# Patient Record
Sex: FEMALE | Race: WHITE | NOT HISPANIC OR LATINO | URBAN - METROPOLITAN AREA
[De-identification: names, ages, dates, MRNs, and addresses within clinical notes are randomized per-mention and may not be internally consistent; named-entity substitution may affect disease eponyms.]

---

## 2023-02-26 PROBLEM — Z78.0 POST-MENOPAUSAL: Status: ACTIVE | Noted: 2023-02-26

## 2023-02-26 PROBLEM — M17.9 OSTEOARTHRITIS OF KNEE: Status: ACTIVE | Noted: 2023-02-26

## 2023-02-26 PROBLEM — E66.01 MORBID OBESITY (MULTI): Status: ACTIVE | Noted: 2023-02-26

## 2023-02-26 PROBLEM — M19.90 OSTEOARTHRITIS: Status: ACTIVE | Noted: 2023-02-26

## 2023-02-26 PROBLEM — I10 BENIGN ESSENTIAL HYPERTENSION: Status: ACTIVE | Noted: 2023-02-26

## 2023-02-26 PROBLEM — F32.A DEPRESSION: Status: ACTIVE | Noted: 2023-02-26

## 2023-02-26 PROBLEM — M81.0 OSTEOPOROSIS: Status: ACTIVE | Noted: 2023-02-26

## 2023-02-26 RX ORDER — DICLOFENAC SODIUM 50 MG/1
1 TABLET, DELAYED RELEASE ORAL 3 TIMES DAILY
COMMUNITY
Start: 2022-09-12 | End: 2023-06-01 | Stop reason: SDUPTHER

## 2023-02-26 RX ORDER — POTASSIUM CHLORIDE 20 MEQ/1
TABLET, EXTENDED RELEASE ORAL
COMMUNITY
End: 2023-04-12 | Stop reason: SDUPTHER

## 2023-02-26 RX ORDER — MINERAL OIL
1 ENEMA (ML) RECTAL DAILY
COMMUNITY
Start: 2022-09-12

## 2023-02-26 RX ORDER — METOPROLOL SUCCINATE 50 MG/1
1 TABLET, EXTENDED RELEASE ORAL DAILY
COMMUNITY
Start: 2022-09-12 | End: 2023-04-12 | Stop reason: SDUPTHER

## 2023-02-26 RX ORDER — AMLODIPINE BESYLATE 10 MG/1
1 TABLET ORAL DAILY
COMMUNITY
End: 2023-04-12 | Stop reason: SDUPTHER

## 2023-02-26 RX ORDER — ENALAPRIL MALEATE 20 MG/1
1 TABLET ORAL DAILY
COMMUNITY
End: 2023-04-12 | Stop reason: SDUPTHER

## 2023-02-26 RX ORDER — ESCITALOPRAM OXALATE 10 MG/1
1 TABLET ORAL DAILY
COMMUNITY
End: 2023-04-12 | Stop reason: SDUPTHER

## 2023-02-26 RX ORDER — TRIAMTERENE/HYDROCHLOROTHIAZID 37.5-25 MG
1 TABLET ORAL DAILY
COMMUNITY
End: 2023-04-12 | Stop reason: SDUPTHER

## 2023-04-12 ENCOUNTER — OFFICE VISIT (OUTPATIENT)
Dept: PRIMARY CARE | Facility: CLINIC | Age: 78
End: 2023-04-12
Payer: MEDICARE

## 2023-04-12 ENCOUNTER — LAB (OUTPATIENT)
Dept: LAB | Facility: LAB | Age: 78
End: 2023-04-12
Payer: MEDICARE

## 2023-04-12 VITALS
HEART RATE: 72 BPM | OXYGEN SATURATION: 98 % | WEIGHT: 198.8 LBS | SYSTOLIC BLOOD PRESSURE: 131 MMHG | BODY MASS INDEX: 39.03 KG/M2 | HEIGHT: 60 IN | DIASTOLIC BLOOD PRESSURE: 65 MMHG

## 2023-04-12 DIAGNOSIS — F41.9 ANXIETY: ICD-10-CM

## 2023-04-12 DIAGNOSIS — I10 BENIGN ESSENTIAL HYPERTENSION: ICD-10-CM

## 2023-04-12 DIAGNOSIS — I10 BENIGN ESSENTIAL HYPERTENSION: Primary | ICD-10-CM

## 2023-04-12 DIAGNOSIS — Z23 ENCOUNTER FOR IMMUNIZATION: ICD-10-CM

## 2023-04-12 DIAGNOSIS — F32.1 CURRENT MODERATE EPISODE OF MAJOR DEPRESSIVE DISORDER WITHOUT PRIOR EPISODE (MULTI): ICD-10-CM

## 2023-04-12 DIAGNOSIS — E78.49 OTHER HYPERLIPIDEMIA: ICD-10-CM

## 2023-04-12 DIAGNOSIS — E87.6 HYPOKALEMIA: ICD-10-CM

## 2023-04-12 LAB
ALANINE AMINOTRANSFERASE (SGPT) (U/L) IN SER/PLAS: 14 U/L (ref 7–45)
ALBUMIN (G/DL) IN SER/PLAS: 4.2 G/DL (ref 3.4–5)
ALKALINE PHOSPHATASE (U/L) IN SER/PLAS: 81 U/L (ref 33–136)
ANION GAP IN SER/PLAS: 13 MMOL/L (ref 10–20)
ASPARTATE AMINOTRANSFERASE (SGOT) (U/L) IN SER/PLAS: 14 U/L (ref 9–39)
BASOPHILS (10*3/UL) IN BLOOD BY AUTOMATED COUNT: 0.04 X10E9/L (ref 0–0.1)
BASOPHILS/100 LEUKOCYTES IN BLOOD BY AUTOMATED COUNT: 0.6 % (ref 0–2)
BILIRUBIN TOTAL (MG/DL) IN SER/PLAS: 0.4 MG/DL (ref 0–1.2)
CALCIUM (MG/DL) IN SER/PLAS: 9.6 MG/DL (ref 8.6–10.6)
CARBON DIOXIDE, TOTAL (MMOL/L) IN SER/PLAS: 28 MMOL/L (ref 21–32)
CHLORIDE (MMOL/L) IN SER/PLAS: 106 MMOL/L (ref 98–107)
CHOLESTEROL (MG/DL) IN SER/PLAS: 188 MG/DL (ref 0–199)
CHOLESTEROL IN HDL (MG/DL) IN SER/PLAS: 57.2 MG/DL
CHOLESTEROL/HDL RATIO: 3.3
CREATININE (MG/DL) IN SER/PLAS: 0.79 MG/DL (ref 0.5–1.05)
EOSINOPHILS (10*3/UL) IN BLOOD BY AUTOMATED COUNT: 0.14 X10E9/L (ref 0–0.4)
EOSINOPHILS/100 LEUKOCYTES IN BLOOD BY AUTOMATED COUNT: 2.3 % (ref 0–6)
ERYTHROCYTE DISTRIBUTION WIDTH (RATIO) BY AUTOMATED COUNT: 13.9 % (ref 11.5–14.5)
ERYTHROCYTE MEAN CORPUSCULAR HEMOGLOBIN CONCENTRATION (G/DL) BY AUTOMATED: 30.7 G/DL (ref 32–36)
ERYTHROCYTE MEAN CORPUSCULAR VOLUME (FL) BY AUTOMATED COUNT: 92 FL (ref 80–100)
ERYTHROCYTES (10*6/UL) IN BLOOD BY AUTOMATED COUNT: 5.29 X10E12/L (ref 4–5.2)
GFR FEMALE: 78 ML/MIN/1.73M2
GLUCOSE (MG/DL) IN SER/PLAS: 107 MG/DL (ref 74–99)
HEMATOCRIT (%) IN BLOOD BY AUTOMATED COUNT: 48.5 % (ref 36–46)
HEMOGLOBIN (G/DL) IN BLOOD: 14.9 G/DL (ref 12–16)
IMMATURE GRANULOCYTES/100 LEUKOCYTES IN BLOOD BY AUTOMATED COUNT: 0.2 % (ref 0–0.9)
LDL: 98 MG/DL (ref 0–99)
LEUKOCYTES (10*3/UL) IN BLOOD BY AUTOMATED COUNT: 6.2 X10E9/L (ref 4.4–11.3)
LYMPHOCYTES (10*3/UL) IN BLOOD BY AUTOMATED COUNT: 1.29 X10E9/L (ref 0.8–3)
LYMPHOCYTES/100 LEUKOCYTES IN BLOOD BY AUTOMATED COUNT: 20.9 % (ref 13–44)
MONOCYTES (10*3/UL) IN BLOOD BY AUTOMATED COUNT: 0.42 X10E9/L (ref 0.05–0.8)
MONOCYTES/100 LEUKOCYTES IN BLOOD BY AUTOMATED COUNT: 6.8 % (ref 2–10)
NEUTROPHILS (10*3/UL) IN BLOOD BY AUTOMATED COUNT: 4.26 X10E9/L (ref 1.6–5.5)
NEUTROPHILS/100 LEUKOCYTES IN BLOOD BY AUTOMATED COUNT: 69.2 % (ref 40–80)
NRBC (PER 100 WBCS) BY AUTOMATED COUNT: 0 /100 WBC (ref 0–0)
PLATELETS (10*3/UL) IN BLOOD AUTOMATED COUNT: 151 X10E9/L (ref 150–450)
POTASSIUM (MMOL/L) IN SER/PLAS: 4.6 MMOL/L (ref 3.5–5.3)
PROTEIN TOTAL: 6.7 G/DL (ref 6.4–8.2)
SODIUM (MMOL/L) IN SER/PLAS: 142 MMOL/L (ref 136–145)
TRIGLYCERIDE (MG/DL) IN SER/PLAS: 166 MG/DL (ref 0–149)
UREA NITROGEN (MG/DL) IN SER/PLAS: 34 MG/DL (ref 6–23)
VLDL: 33 MG/DL (ref 0–40)

## 2023-04-12 PROCEDURE — 1159F MED LIST DOCD IN RCRD: CPT | Performed by: FAMILY MEDICINE

## 2023-04-12 PROCEDURE — 1160F RVW MEDS BY RX/DR IN RCRD: CPT | Performed by: FAMILY MEDICINE

## 2023-04-12 PROCEDURE — 3078F DIAST BP <80 MM HG: CPT | Performed by: FAMILY MEDICINE

## 2023-04-12 PROCEDURE — 85025 COMPLETE CBC W/AUTO DIFF WBC: CPT

## 2023-04-12 PROCEDURE — 80061 LIPID PANEL: CPT

## 2023-04-12 PROCEDURE — G0009 ADMIN PNEUMOCOCCAL VACCINE: HCPCS | Performed by: FAMILY MEDICINE

## 2023-04-12 PROCEDURE — 80053 COMPREHEN METABOLIC PANEL: CPT

## 2023-04-12 PROCEDURE — 1036F TOBACCO NON-USER: CPT | Performed by: FAMILY MEDICINE

## 2023-04-12 PROCEDURE — 90677 PCV20 VACCINE IM: CPT | Performed by: FAMILY MEDICINE

## 2023-04-12 PROCEDURE — 99214 OFFICE O/P EST MOD 30 MIN: CPT | Performed by: FAMILY MEDICINE

## 2023-04-12 PROCEDURE — 3075F SYST BP GE 130 - 139MM HG: CPT | Performed by: FAMILY MEDICINE

## 2023-04-12 PROCEDURE — 36415 COLL VENOUS BLD VENIPUNCTURE: CPT

## 2023-04-12 RX ORDER — ESCITALOPRAM OXALATE 10 MG/1
10 TABLET ORAL DAILY
Qty: 90 TABLET | Refills: 1 | Status: SHIPPED | OUTPATIENT
Start: 2023-04-12 | End: 2023-11-22 | Stop reason: SDUPTHER

## 2023-04-12 RX ORDER — ENALAPRIL MALEATE 20 MG/1
20 TABLET ORAL DAILY
Qty: 90 TABLET | Refills: 1 | Status: SHIPPED | OUTPATIENT
Start: 2023-04-12 | End: 2023-10-02

## 2023-04-12 RX ORDER — TRIAMTERENE/HYDROCHLOROTHIAZID 37.5-25 MG
1 TABLET ORAL DAILY
Qty: 90 TABLET | Refills: 1 | Status: SHIPPED | OUTPATIENT
Start: 2023-04-12 | End: 2023-11-22 | Stop reason: SDUPTHER

## 2023-04-12 RX ORDER — POTASSIUM CHLORIDE 20 MEQ/1
TABLET, EXTENDED RELEASE ORAL
Qty: 180 TABLET | Refills: 1 | Status: SHIPPED | OUTPATIENT
Start: 2023-04-12 | End: 2024-02-29 | Stop reason: SDUPTHER

## 2023-04-12 RX ORDER — METOPROLOL SUCCINATE 50 MG/1
50 TABLET, EXTENDED RELEASE ORAL DAILY
Qty: 90 TABLET | Refills: 1 | Status: SHIPPED | OUTPATIENT
Start: 2023-04-12 | End: 2023-11-22 | Stop reason: SDUPTHER

## 2023-04-12 RX ORDER — AMLODIPINE BESYLATE 10 MG/1
10 TABLET ORAL DAILY
Qty: 90 TABLET | Refills: 1 | Status: SHIPPED | OUTPATIENT
Start: 2023-04-12 | End: 2023-10-02

## 2023-04-12 NOTE — PROGRESS NOTES
Subjective   Patient ID: Agata Alatorre [id number] is a 77 y.o. female who presents for Follow-up (6mth follow up).    HPI     HTN - Norvasc 10 mg , metoprolol 50 mg , Triamterine - HCTZ, enalparil 20 mg daily.   also taking KCL 20 mg daily. Needs refill on medication.     Anxiety/Depression - on escitalopram 10 mg daily for last 10 years.   Following Keto Diet. Medication working really well denies any side effect.     history of LBBB used to see cardiology Dr. Cabrera. No recent chest pain or shortness of breath no headache or dizziness.  Osteoarthritis -patient has history of hip and knee replacement by Dr. Peng currently taking diclofenac for pain control    like to take her Dog out , read, playing games, [place].   Social History     Tobacco Use    Smoking status: Former     Packs/day: 1.00     Years: 35.00     Pack years: 35.00     Types: Cigarettes    Smokeless tobacco: Never   Substance Use Topics    Alcohol use: Never    Drug use: Never       Allergies   Allergen Reactions    Digoxin Unknown       Current Outpatient Medications   Medication Sig Dispense Refill    amLODIPine (Norvasc) 10 mg tablet Take 1 tablet (10 mg) by mouth once daily.      diclofenac (Voltaren) 50 mg EC tablet Take 1 tablet (50 mg) by mouth in the morning and 1 tablet (50 mg) in the evening and 1 tablet (50 mg) before bedtime.      enalapril (Vasotec) 20 mg tablet Take 1 tablet (20 mg) by mouth once daily.      escitalopram (Lexapro) 10 mg tablet Take 1 tablet (10 mg) by mouth once daily.      fexofenadine (Allegra) 180 mg tablet Take 1 tablet (180 mg) by mouth once daily.      metoprolol succinate XL (Toprol-XL) 50 mg 24 hr tablet Take 1 tablet (50 mg) by mouth once daily.      potassium chloride CR (Klor-Con M20) 20 mEq ER tablet Take by mouth. Take 1 tablet daily but 2 tablets every 2nd day as directed      triamterene-hydrochlorothiazid (Maxzide-25) 37.5-25 mg tablet Take 1 tablet by mouth once daily.       No current  facility-administered medications for this visit.       Physical Exam  /65 (BP Location: Right arm, Patient Position: Sitting, BP Cuff Size: Large adult)   Pulse 72   Ht 1.524 m (5')   Wt 90.2 kg (198 lb 12.8 oz)   SpO2 98%   BMI 38.83 kg/m²     General Appearance:  Alert, cooperative, no distress,   Head:  Normocephalic, atraumatic   Eyes:  PERRL, conjunctiva/corneas clear, EOM's intact,    Lungs:   Clear to auscultation bilaterally, respirations unlabored   Heart:  Regular rate and rhythm, S1 and S2 normal, no murmur,    Neurologic: Normal      No visits with results within 3 Month(s) from this visit.   Latest known visit with results is:   No results found for any previous visit.         Assessment/Plan   Diagnoses and all orders for this visit:  Benign essential hypertension  -     amLODIPine (Norvasc) 10 mg tablet; Take 1 tablet (10 mg) by mouth once daily.  -     enalapril (Vasotec) 20 mg tablet; Take 1 tablet (20 mg) by mouth once daily.  -     metoprolol succinate XL (Toprol-XL) 50 mg 24 hr tablet; Take 1 tablet (50 mg) by mouth once daily.  -     triamterene-hydrochlorothiazid (Maxzide-25) 37.5-25 mg tablet; Take 1 tablet by mouth once daily.  -     Comprehensive Metabolic Panel; Future  -     CBC and Auto Differential; Future  -     Lipid Panel; Future  Hypokalemia  -     potassium chloride CR (Klor-Con M20) 20 mEq ER tablet; Take 1 tablet daily but 2 tablets every 2nd day as directed  -     Comprehensive Metabolic Panel; Future  Anxiety  -     escitalopram (Lexapro) 10 mg tablet; Take 1 tablet (10 mg) by mouth once daily.  -     CBC and Auto Differential; Future  Current moderate episode of major depressive disorder without prior episode (CMS/HCC)  -     Lipid Panel; Future  Encounter for immunization  -     Pneumococcal conjugate vaccine, 20-valent, adult (PREVNAR 20)  Other hyperlipidemia  -     Lipid Panel; Future  Hypertension  Anxiety depression  Chronic knee arthritis  S/p hip and knee  replacement  History of LBBB  Screen colon cancer  Immunization    Hypertension   continue enalapril metoprolol amlodipine and triamterene hydrochlorothiazide  BP well controlled  Get labs     Anxiety depression  Mood stable with escitalopram 10 mg daily  Med refill given    S/p hip and knee replacement follow-up Ortho  Continue diclofenac as needed  Advised to get Cologuard  Prevnar 20 given    Mammo normal 2022   Cologuard 2022.  Can take Curcumin 500 twice daily for aches and pains    F/up PRN or 6 months

## 2023-04-13 ENCOUNTER — TELEPHONE (OUTPATIENT)
Dept: PRIMARY CARE | Facility: CLINIC | Age: 78
End: 2023-04-13
Payer: MEDICARE

## 2023-04-13 NOTE — TELEPHONE ENCOUNTER
----- Message from Cha Lyons MD sent at 4/13/2023  9:43 AM EDT -----  Slightly elevated glucose if patient want to consider hemoglobin A1c to check for diabetes we can check it.

## 2023-06-01 DIAGNOSIS — M19.90 OSTEOARTHRITIS, UNSPECIFIED OSTEOARTHRITIS TYPE, UNSPECIFIED SITE: ICD-10-CM

## 2023-06-01 RX ORDER — DICLOFENAC SODIUM 50 MG/1
50 TABLET, DELAYED RELEASE ORAL 3 TIMES DAILY
Qty: 270 TABLET | Refills: 1 | Status: SHIPPED | OUTPATIENT
Start: 2023-06-01 | End: 2023-11-22 | Stop reason: SDUPTHER

## 2023-09-29 DIAGNOSIS — I10 BENIGN ESSENTIAL HYPERTENSION: ICD-10-CM

## 2023-10-02 RX ORDER — AMLODIPINE BESYLATE 10 MG/1
10 TABLET ORAL DAILY
Qty: 90 TABLET | Refills: 1 | Status: SHIPPED | OUTPATIENT
Start: 2023-10-02 | End: 2024-02-29 | Stop reason: SDUPTHER

## 2023-10-02 RX ORDER — ENALAPRIL MALEATE 20 MG/1
20 TABLET ORAL DAILY
Qty: 90 TABLET | Refills: 1 | Status: SHIPPED | OUTPATIENT
Start: 2023-10-02 | End: 2024-02-29 | Stop reason: SDUPTHER

## 2023-10-10 ENCOUNTER — OFFICE VISIT (OUTPATIENT)
Dept: PRIMARY CARE | Facility: CLINIC | Age: 78
End: 2023-10-10
Payer: MEDICARE

## 2023-10-10 VITALS
WEIGHT: 201.8 LBS | HEART RATE: 74 BPM | HEIGHT: 60 IN | BODY MASS INDEX: 39.62 KG/M2 | DIASTOLIC BLOOD PRESSURE: 81 MMHG | OXYGEN SATURATION: 94 % | SYSTOLIC BLOOD PRESSURE: 145 MMHG

## 2023-10-10 DIAGNOSIS — Z00.00 ENCOUNTER FOR ANNUAL WELLNESS EXAM IN MEDICARE PATIENT: Primary | ICD-10-CM

## 2023-10-10 DIAGNOSIS — Z23 NEED FOR IMMUNIZATION AGAINST INFLUENZA: ICD-10-CM

## 2023-10-10 PROCEDURE — 1036F TOBACCO NON-USER: CPT | Performed by: FAMILY MEDICINE

## 2023-10-10 PROCEDURE — G0439 PPPS, SUBSEQ VISIT: HCPCS | Performed by: FAMILY MEDICINE

## 2023-10-10 PROCEDURE — 90662 IIV NO PRSV INCREASED AG IM: CPT | Performed by: FAMILY MEDICINE

## 2023-10-10 PROCEDURE — G0446 INTENS BEHAVE THER CARDIO DX: HCPCS | Performed by: FAMILY MEDICINE

## 2023-10-10 PROCEDURE — 1159F MED LIST DOCD IN RCRD: CPT | Performed by: FAMILY MEDICINE

## 2023-10-10 PROCEDURE — 3077F SYST BP >= 140 MM HG: CPT | Performed by: FAMILY MEDICINE

## 2023-10-10 PROCEDURE — 1160F RVW MEDS BY RX/DR IN RCRD: CPT | Performed by: FAMILY MEDICINE

## 2023-10-10 PROCEDURE — G0008 ADMIN INFLUENZA VIRUS VAC: HCPCS | Performed by: FAMILY MEDICINE

## 2023-10-10 PROCEDURE — 3079F DIAST BP 80-89 MM HG: CPT | Performed by: FAMILY MEDICINE

## 2023-10-10 PROCEDURE — 99497 ADVNCD CARE PLAN 30 MIN: CPT | Performed by: FAMILY MEDICINE

## 2023-10-10 PROCEDURE — 1170F FXNL STATUS ASSESSED: CPT | Performed by: FAMILY MEDICINE

## 2023-10-10 RX ORDER — CALCIPOTRIENE 50 UG/G
CREAM TOPICAL AS NEEDED
COMMUNITY
End: 2024-04-15 | Stop reason: ALTCHOICE

## 2023-10-10 ASSESSMENT — PATIENT HEALTH QUESTIONNAIRE - PHQ9
2. FEELING DOWN, DEPRESSED OR HOPELESS: NOT AT ALL
SUM OF ALL RESPONSES TO PHQ9 QUESTIONS 1 AND 2: 0
1. LITTLE INTEREST OR PLEASURE IN DOING THINGS: NOT AT ALL

## 2023-10-10 ASSESSMENT — ACTIVITIES OF DAILY LIVING (ADL)
DOING_HOUSEWORK: INDEPENDENT
BATHING: INDEPENDENT
TAKING_MEDICATION: INDEPENDENT
GROCERY_SHOPPING: INDEPENDENT
MANAGING_FINANCES: INDEPENDENT
DRESSING: INDEPENDENT

## 2023-10-10 NOTE — PROGRESS NOTES
Subjective   Patient ID: Agata Alatorre [id number] is a 77 y.o. female who presents for Medicare Annual Wellness Visit Subsequent (Medicare Wellness).    HPI     Pt is here for medicare wellness.   And is walking with cane.  No fall in last 6 months.  Patient is very independent doing everything by herself.  Patient driving going out for shopping good memory good hearing.  Pt is Following keto Diet and [place] desert and keto chips  HTN - Norvasc 10 mg , metoprolol 50 mg , Triamterine - HCTZ, enalparil 20 mg daily.   also taking KCL 20 mg daily. BP normal.    Anxiety/Depression - on escitalopram 10 mg daily for last 10 years.   Following Keto Diet. Medication working really well denies any side effect.     history of LBBB used to see cardiology Dr. Cabrera. No recent chest pain or shortness of breath no headache or dizziness.    Osteoarthritis -patient has history of hip and knee replacement by Dr. Peng currently taking diclofenac and turmeric for pain control    like to take her Dog out , read, playing games, Facebook.     Patient had COVID couple of weeks ago.  Patient required nothing.  Feeling somewhat fatigue and mild cough.  No chest pain shortness of breath no headache dizziness no fever.  Advised to take Mucinex for cough.  Social History     Tobacco Use    Smoking status: Former     Packs/day: 1.00     Years: 35.00     Additional pack years: 0.00     Total pack years: 35.00     Types: Cigarettes    Smokeless tobacco: Never   Vaping Use    Vaping Use: Never used   Substance Use Topics    Alcohol use: Never    Drug use: Never       Allergies   Allergen Reactions    Adhesive Tape-Silicones Other    Digoxin Unknown and Rash       Current Outpatient Medications   Medication Sig Dispense Refill    amLODIPine (Norvasc) 10 mg tablet TAKE 1 TABLET ONCE DAILY 90 tablet 1    calcipotriene (Dovonex) 0.005 % cream Apply topically if needed.      diclofenac (Voltaren) 50 mg EC tablet Take 1 tablet (50 mg) by mouth 3 times  a day. 270 tablet 1    enalapril (Vasotec) 20 mg tablet TAKE 1 TABLET ONCE DAILY 90 tablet 1    escitalopram (Lexapro) 10 mg tablet Take 1 tablet (10 mg) by mouth once daily. 90 tablet 1    fexofenadine (Allegra) 180 mg tablet Take 1 tablet (180 mg) by mouth once daily.      metoprolol succinate XL (Toprol-XL) 50 mg 24 hr tablet Take 1 tablet (50 mg) by mouth once daily. 90 tablet 1    potassium chloride CR (Klor-Con M20) 20 mEq ER tablet Take 1 tablet daily but 2 tablets every 2nd day as directed 180 tablet 1    triamterene-hydrochlorothiazid (Maxzide-25) 37.5-25 mg tablet Take 1 tablet by mouth once daily. 90 tablet 1     No current facility-administered medications for this visit.       Physical Exam  /81   Pulse 74   Ht 1.524 m (5')   Wt 91.5 kg (201 lb 12.8 oz)   SpO2 94%   BMI 39.41 kg/m²     General Appearance:  Alert, cooperative, no distress,   Head:  Normocephalic, atraumatic   Eyes:  PERRL, conjunctiva/corneas clear, EOM's intact,    Lungs:   Clear to auscultation bilaterally, respirations unlabored   Heart:  Regular rate and rhythm, S1 and S2 normal, no murmur,    Neurologic: Normal      No visits with results within 3 Month(s) from this visit.   Latest known visit with results is:   Lab on 04/12/2023   Component Date Value Ref Range Status    Glucose 04/12/2023 107 (H)  74 - 99 mg/dL Final    Sodium 04/12/2023 142  136 - 145 mmol/L Final    Potassium 04/12/2023 4.6  3.5 - 5.3 mmol/L Final    Chloride 04/12/2023 106  98 - 107 mmol/L Final    Bicarbonate 04/12/2023 28  21 - 32 mmol/L Final    Anion Gap 04/12/2023 13  10 - 20 mmol/L Final    Urea Nitrogen 04/12/2023 34 (H)  6 - 23 mg/dL Final    Creatinine 04/12/2023 0.79  0.50 - 1.05 mg/dL Final    GFR Female 04/12/2023 78  >90 mL/min/1.73m2 Final     CALCULATIONS OF ESTIMATED GFR ARE PERFORMED   USING THE 2021 CKD-EPI STUDY REFIT EQUATION   WITHOUT THE RACE VARIABLE FOR THE IDMS-TRACEABLE   CREATININE  METHODS.    https://jasn.asnjournals.org/content/early/2021/09/22/ASN.[email or web address]    Calcium 04/12/2023 9.6  8.6 - 10.6 mg/dL Final    Albumin 04/12/2023 4.2  3.4 - 5.0 g/dL Final    Alkaline Phosphatase 04/12/2023 81  33 - 136 U/L Final    Total Protein 04/12/2023 6.7  6.4 - 8.2 g/dL Final    AST 04/12/2023 14  9 - 39 U/L Final    Total Bilirubin 04/12/2023 0.4  0.0 - 1.2 mg/dL Final    ALT (SGPT) 04/12/2023 14  7 - 45 U/L Final     Patients treated with Sulfasalazine may generate    falsely decreased results for ALT.    WBC 04/12/2023 6.2  4.4 - 11.3 x10E9/L Final    nRBC 04/12/2023 0.0  0.0 - 0.0 /100 WBC Final    RBC 04/12/2023 5.29 (H)  4.00 - 5.20 x10E12/L Final    Hemoglobin 04/12/2023 14.9  12.0 - 16.0 g/dL Final    Hematocrit 04/12/2023 48.5 (H)  36.0 - 46.0 % Final    MCV 04/12/2023 92  80 - 100 fL Final    MCHC 04/12/2023 30.7 (L)  32.0 - 36.0 g/dL Final    Platelets 04/12/2023 151  150 - 450 x10E9/L Final    RDW 04/12/2023 13.9  11.5 - 14.5 % Final    Neutrophils % 04/12/2023 69.2  40.0 - 80.0 % Final    Immature Granulocytes %, Automated 04/12/2023 0.2  0.0 - 0.9 % Final     Immature Granulocyte Count (IG) includes promyelocytes,    myelocytes and metamyelocytes but does not include bands.   Percent differential counts (%) should be interpreted in the   context of the absolute cell counts (cells/L).    Lymphocytes % 04/12/2023 20.9  13.0 - 44.0 % Final    Monocytes % 04/12/2023 6.8  2.0 - 10.0 % Final    Eosinophils % 04/12/2023 2.3  0.0 - 6.0 % Final    Basophils % 04/12/2023 0.6  0.0 - 2.0 % Final    Neutrophils Absolute 04/12/2023 4.26  1.60 - 5.50 x10E9/L Final    Lymphocytes Absolute 04/12/2023 1.29  0.80 - 3.00 x10E9/L Final    Monocytes Absolute 04/12/2023 0.42  0.05 - 0.80 x10E9/L Final    Eosinophils Absolute 04/12/2023 0.14  0.00 - 0.40 x10E9/L Final    Basophils Absolute 04/12/2023 0.04  0.00 - 0.10 x10E9/L Final    Cholesterol 04/12/2023 188  0 - 199 mg/dL Final    .      AGE      DESIRABLE    BORDERLINE HIGH   HIGH     0-19 Y     0 - 169       170 - 199     >/= 200    20-24 Y     0 - 189       190 - 224     >/= 225         >24 Y     0 - 199       200 - 239     >/= 240   **All ranges are based on fasting samples. Specific   therapeutic targets will vary based on patient-specific   cardiac risk.  .   Pediatric guidelines reference:Pediatrics 2011, 128(S5).   Adult guidelines reference: NCEP ATPIII Guidelines,     TANYA 2001, 258:0036-97  .   Venipuncture immediately after or during the    administration of Metamizole may lead to falsely   low results. Testing should be performed immediately   prior to Metamizole dosing.    HDL 04/12/2023 57.2  mg/dL Final    .      AGE      VERY LOW   LOW     NORMAL    HIGH       0-19 Y       < 35   < 40     40-45     ----    20-24 Y       ----   < 40       >45     ----      >24 Y       ----   < 40     40-60      >60  .    Cholesterol/HDL Ratio 04/12/2023 3.3   Final    REF VALUES  DESIRABLE  < 3.4  HIGH RISK  > 5.0    LDL 04/12/2023 98  0 - 99 mg/dL Final    .                           NEAR      BORD      AGE      DESIRABLE  OPTIMAL    HIGH     HIGH     VERY HIGH     0-19 Y     0 - 109     ---    110-129   >/= 130     ----    20-24 Y     0 - 119     ---    120-159   >/= 160     ----      >24 Y     0 -  99   100-129  130-159   160-189     >/=190  .    VLDL 04/12/2023 33  0 - 40 mg/dL Final    Triglycerides 04/12/2023 166 (H)  0 - 149 mg/dL Final    .      AGE      DESIRABLE   BORDERLINE HIGH   HIGH     VERY HIGH   0 D-90 D    19 - 174         ----         ----        ----  91 D- 9 Y     0 -  74        75 -  99     >/= 100      ----    10-19 Y     0 -  89        90 - 129     >/= 130      ----    20-24 Y     0 - 114       115 - 149     >/= 150      ----         >24 Y     0 - 149       150 - 199    200- 499    >/= 500  .   Venipuncture immediately after or during the    administration of Metamizole may lead to falsely   low results. Testing should be performed immediately    prior to Metamizole dosing.         Assessment/Plan   Diagnoses and all orders for this visit:  Agata was seen today for medicare annual wellness visit subsequent.  Diagnoses and all orders for this visit:  Encounter for annual wellness exam in Medicare patient (Primary)  Need for immunization against influenza  -     Flu vaccine, quadrivalent, high-dose, preservative free, age 65y+ (FLUZONE)  BMI 39.0-39.9,adult  Patient was advised to follow healthy diet and do daily walking or walking in a pool to improve pain and lose weight  Labs reviewed from last visit and pt made aware.  Anxiety/MDD  -     escitalopram (Lexapro) 10 mg tablet; Take 1 tablet (10 mg) by mouth once daily.    Hypertension  Anxiety depression  Chronic knee arthritis  S/p hip and knee replacement  History of LBBB  Screen colon cancer  Immunization    Hypertension   continue enalapril metoprolol amlodipine and triamterene hydrochlorothiazide  BP well controlled      Anxiety depression  Mood stable with escitalopram 10 mg daily  Med refill given    S/p hip and knee replacement follow-up Ortho  Continue diclofenac as needed  Advised to get Cologuard  Prevnar 20 given    Mammo normal [id number]  DEXA scan October 2020 2 repeat October 2024  Up-to-date flu and Prevnar 20  Advised to get Shingrix at pharmacy once respiratory symptoms are better   Cologuard 2022.  continue Curcumin 500 twice daily for aches and pains    F/up PRN or 6 months.    Advance Care Planning   Does not have living will or medical power of .  Patient is going to make one.  Currently full code.    Advanced care planning was discussed for 20 mins  DIscussed in details the options of advanced directives with patient in a voluntary nature. We discussed the differences between full code, comfort care arrest and comfort care. Patient was educated in detail regarding end-of-life care including options for hospice and palliative care. I provided details to patient regarding the  importance of creating both a Living Will and Power of  documents and encouraged patient to complete these documents.    I spent 15 minutes on cardiovascular councelling. Pt was advised to take meds for HTN, HLD. Discussed at length about various ways of loosing weight including healthy Diet, daily Aerobic exercise, taking meds.      F/up 6 months

## 2023-10-12 ENCOUNTER — APPOINTMENT (OUTPATIENT)
Dept: PRIMARY CARE | Facility: CLINIC | Age: 78
End: 2023-10-12
Payer: MEDICARE

## 2023-11-22 DIAGNOSIS — F41.9 ANXIETY: ICD-10-CM

## 2023-11-22 DIAGNOSIS — M19.90 OSTEOARTHRITIS, UNSPECIFIED OSTEOARTHRITIS TYPE, UNSPECIFIED SITE: ICD-10-CM

## 2023-11-22 DIAGNOSIS — I10 BENIGN ESSENTIAL HYPERTENSION: ICD-10-CM

## 2023-11-22 RX ORDER — DICLOFENAC SODIUM 50 MG/1
50 TABLET, DELAYED RELEASE ORAL 3 TIMES DAILY
Qty: 270 TABLET | Refills: 1 | Status: SHIPPED | OUTPATIENT
Start: 2023-11-22

## 2023-11-22 RX ORDER — TRIAMTERENE/HYDROCHLOROTHIAZID 37.5-25 MG
1 TABLET ORAL DAILY
Qty: 90 TABLET | Refills: 1 | Status: SHIPPED | OUTPATIENT
Start: 2023-11-22 | End: 2024-04-15 | Stop reason: ALTCHOICE

## 2023-11-22 RX ORDER — METOPROLOL SUCCINATE 50 MG/1
50 TABLET, EXTENDED RELEASE ORAL DAILY
Qty: 90 TABLET | Refills: 1 | Status: SHIPPED | OUTPATIENT
Start: 2023-11-22 | End: 2024-05-07 | Stop reason: SDUPTHER

## 2023-11-22 RX ORDER — ESCITALOPRAM OXALATE 10 MG/1
10 TABLET ORAL DAILY
Qty: 90 TABLET | Refills: 1 | Status: SHIPPED | OUTPATIENT
Start: 2023-11-22 | End: 2024-05-07 | Stop reason: SDUPTHER

## 2024-02-29 DIAGNOSIS — E87.6 HYPOKALEMIA: ICD-10-CM

## 2024-02-29 DIAGNOSIS — I10 BENIGN ESSENTIAL HYPERTENSION: ICD-10-CM

## 2024-02-29 RX ORDER — AMLODIPINE BESYLATE 10 MG/1
10 TABLET ORAL DAILY
Qty: 90 TABLET | Refills: 1 | Status: SHIPPED | OUTPATIENT
Start: 2024-02-29

## 2024-02-29 RX ORDER — POTASSIUM CHLORIDE 20 MEQ/1
TABLET, EXTENDED RELEASE ORAL
Qty: 180 TABLET | Refills: 1 | Status: SHIPPED | OUTPATIENT
Start: 2024-02-29

## 2024-02-29 RX ORDER — ENALAPRIL MALEATE 20 MG/1
20 TABLET ORAL DAILY
Qty: 90 TABLET | Refills: 1 | Status: SHIPPED | OUTPATIENT
Start: 2024-02-29 | End: 2024-04-15 | Stop reason: SDUPTHER

## 2024-04-10 ENCOUNTER — APPOINTMENT (OUTPATIENT)
Dept: PRIMARY CARE | Facility: CLINIC | Age: 79
End: 2024-04-10
Payer: MEDICARE

## 2024-04-15 ENCOUNTER — OFFICE VISIT (OUTPATIENT)
Dept: PRIMARY CARE | Facility: CLINIC | Age: 79
End: 2024-04-15
Payer: MEDICARE

## 2024-04-15 ENCOUNTER — TELEPHONE (OUTPATIENT)
Dept: PRIMARY CARE | Facility: CLINIC | Age: 79
End: 2024-04-15

## 2024-04-15 VITALS
DIASTOLIC BLOOD PRESSURE: 103 MMHG | SYSTOLIC BLOOD PRESSURE: 164 MMHG | HEIGHT: 60 IN | OXYGEN SATURATION: 93 % | WEIGHT: 208 LBS | HEART RATE: 85 BPM | BODY MASS INDEX: 40.84 KG/M2

## 2024-04-15 DIAGNOSIS — I10 BENIGN ESSENTIAL HYPERTENSION: ICD-10-CM

## 2024-04-15 DIAGNOSIS — I10 BENIGN ESSENTIAL HYPERTENSION: Primary | ICD-10-CM

## 2024-04-15 DIAGNOSIS — R06.02 SHORTNESS OF BREATH: ICD-10-CM

## 2024-04-15 DIAGNOSIS — E78.49 OTHER HYPERLIPIDEMIA: ICD-10-CM

## 2024-04-15 DIAGNOSIS — F41.9 ANXIETY: ICD-10-CM

## 2024-04-15 PROCEDURE — 93000 ELECTROCARDIOGRAM COMPLETE: CPT | Performed by: FAMILY MEDICINE

## 2024-04-15 PROCEDURE — 3080F DIAST BP >= 90 MM HG: CPT | Performed by: FAMILY MEDICINE

## 2024-04-15 PROCEDURE — 99214 OFFICE O/P EST MOD 30 MIN: CPT | Performed by: FAMILY MEDICINE

## 2024-04-15 PROCEDURE — 3077F SYST BP >= 140 MM HG: CPT | Performed by: FAMILY MEDICINE

## 2024-04-15 PROCEDURE — 1036F TOBACCO NON-USER: CPT | Performed by: FAMILY MEDICINE

## 2024-04-15 PROCEDURE — 1159F MED LIST DOCD IN RCRD: CPT | Performed by: FAMILY MEDICINE

## 2024-04-15 PROCEDURE — 1160F RVW MEDS BY RX/DR IN RCRD: CPT | Performed by: FAMILY MEDICINE

## 2024-04-15 RX ORDER — LOSARTAN POTASSIUM AND HYDROCHLOROTHIAZIDE 12.5; 5 MG/1; MG/1
1 TABLET ORAL DAILY
Qty: 10 TABLET | Refills: 0 | Status: SHIPPED | OUTPATIENT
Start: 2024-04-15 | End: 2024-05-07 | Stop reason: ALTCHOICE

## 2024-04-15 RX ORDER — ENALAPRIL MALEATE 20 MG/1
20 TABLET ORAL 2 TIMES DAILY
Qty: 180 TABLET | Refills: 0 | Status: SHIPPED | OUTPATIENT
Start: 2024-04-15 | End: 2024-04-16 | Stop reason: SDUPTHER

## 2024-04-15 RX ORDER — LOSARTAN POTASSIUM AND HYDROCHLOROTHIAZIDE 25; 100 MG/1; MG/1
1 TABLET ORAL DAILY
Qty: 90 TABLET | Refills: 0 | Status: SHIPPED | OUTPATIENT
Start: 2024-04-15 | End: 2024-04-15 | Stop reason: ALTCHOICE

## 2024-04-15 ASSESSMENT — ENCOUNTER SYMPTOMS
LOSS OF SENSATION IN FEET: 0
OCCASIONAL FEELINGS OF UNSTEADINESS: 0
DEPRESSION: 0

## 2024-04-15 NOTE — PROGRESS NOTES
Subjective   Patient ID: Agata Alatorre [id number] is a 78 y.o. female who presents for Follow-up (Follow up feeling SOB and coughing spells still since ).    HPI     Pt is Following keto Diet and Juan desert and keto chips.  Was sick few weeks ago.  With flu.  Patient took symptomatic treatment.  Most symptoms are better.  Noticing some shortness of breath on exertion.  BP elevated.  Denies headache or dizziness no chest pain no cough.    HTN - Norvasc 10 mg , metoprolol 50 mg , Triamterine - HCTZ, enalparil 20 mg daily.   also taking KCL 20 mg daily. BP elevated    Anxiety/Depression - on escitalopram 10 mg daily for last 10 years.   Following Keto Diet. Medication working really well denies any side effect.     history of LBBB used to see cardiology Dr. Cabrera. No recent chest pain or shortness of breath no headache or dizziness.    Osteoarthritis -patient has history of hip and knee replacement by Dr. Peng currently taking diclofenac and turmeric for pain control    like to take her Dog out , read, playing games, [employer].       Social History     Tobacco Use    Smoking status: Former     Current packs/day: 0.00     Average packs/day: 1 pack/day for 35.0 years (35.0 ttl pk-yrs)     Types: Cigarettes     Quit date: 1996     Years since quittin.0    Smokeless tobacco: Never   Vaping Use    Vaping status: Never Used   Substance Use Topics    Alcohol use: Never    Drug use: Never       Allergies   Allergen Reactions    Adhesive Tape-Silicones Other    Digoxin Unknown and Rash       Current Outpatient Medications   Medication Sig Dispense Refill    amLODIPine (Norvasc) 10 mg tablet Take 1 tablet (10 mg) by mouth once daily. 90 tablet 1    diclofenac (Voltaren) 50 mg EC tablet Take 1 tablet (50 mg) by mouth 3 times a day. 270 tablet 1    enalapril (Vasotec) 20 mg tablet Take 1 tablet (20 mg) by mouth once daily. 90 tablet 1    escitalopram (Lexapro) 10 mg tablet Take 1 tablet (10 mg) by mouth once daily.  90 tablet 1    fexofenadine (Allegra) 180 mg tablet Take 1 tablet (180 mg) by mouth once daily.      metoprolol succinate XL (Toprol-XL) 50 mg 24 hr tablet Take 1 tablet (50 mg) by mouth once daily. 90 tablet 1    potassium chloride CR 20 mEq ER tablet Take 1 tablet daily but 2 tablets every 2nd day as directed 180 tablet 1    triamterene-hydrochlorothiazid (Maxzide-25) 37.5-25 mg tablet Take 1 tablet by mouth once daily. 90 tablet 1     No current facility-administered medications for this visit.       Physical Exam  BP (!) 164/103   Pulse 85   Ht 1.524 m (5')   Wt 94.3 kg (208 lb)   SpO2 93%   BMI 40.62 kg/m²     General Appearance:  Alert, cooperative, no distress,   Head:  Normocephalic, atraumatic   Eyes:  PERRL, conjunctiva/corneas clear, EOM's intact,    Lungs:   Clear to auscultation bilaterally, respirations unlabored   Heart:  Regular rate and rhythm, S1 and S2 normal, no murmur,    Neurologic: Normal      No visits with results within 3 Month(s) from this visit.   Latest known visit with results is:   Lab on 04/12/2023   Component Date Value Ref Range Status    Glucose 04/12/2023 107 (H)  74 - 99 mg/dL Final    Sodium 04/12/2023 142  136 - 145 mmol/L Final    Potassium 04/12/2023 4.6  3.5 - 5.3 mmol/L Final    Chloride 04/12/2023 106  98 - 107 mmol/L Final    Bicarbonate 04/12/2023 28  21 - 32 mmol/L Final    Anion Gap 04/12/2023 13  10 - 20 mmol/L Final    Urea Nitrogen 04/12/2023 34 (H)  6 - 23 mg/dL Final    Creatinine 04/12/2023 0.79  0.50 - 1.05 mg/dL Final    GFR Female 04/12/2023 78  >90 mL/min/1.73m2 Final     CALCULATIONS OF ESTIMATED GFR ARE PERFORMED   USING THE 2021 CKD-EPI STUDY REFIT EQUATION   WITHOUT THE RACE VARIABLE FOR THE IDMS-TRACEABLE   CREATININE METHODS.    https://jasn.asnjournals.org/content/early/2021/09/22/ASN.7065935572    Calcium 04/12/2023 9.6  8.6 - 10.6 mg/dL Final    Albumin 04/12/2023 4.2  3.4 - 5.0 g/dL Final    Alkaline Phosphatase 04/12/2023 81  33 - 136 U/L  Final    Total Protein 04/12/2023 6.7  6.4 - 8.2 g/dL Final    AST 04/12/2023 14  9 - 39 U/L Final    Total Bilirubin 04/12/2023 0.4  0.0 - 1.2 mg/dL Final    ALT (SGPT) 04/12/2023 14  7 - 45 U/L Final     Patients treated with Sulfasalazine may generate    falsely decreased results for ALT.    WBC 04/12/2023 6.2  4.4 - 11.3 x10E9/L Final    nRBC 04/12/2023 0.0  0.0 - 0.0 /100 WBC Final    RBC 04/12/2023 5.29 (H)  4.00 - 5.20 x10E12/L Final    Hemoglobin 04/12/2023 14.9  12.0 - 16.0 g/dL Final    Hematocrit 04/12/2023 48.5 (H)  36.0 - 46.0 % Final    MCV 04/12/2023 92  80 - 100 fL Final    MCHC 04/12/2023 30.7 (L)  32.0 - 36.0 g/dL Final    Platelets 04/12/2023 151  150 - 450 x10E9/L Final    RDW 04/12/2023 13.9  11.5 - 14.5 % Final    Neutrophils % 04/12/2023 69.2  40.0 - 80.0 % Final    Immature Granulocytes %, Automated 04/12/2023 0.2  0.0 - 0.9 % Final     Immature Granulocyte Count (IG) includes promyelocytes,    myelocytes and metamyelocytes but does not include bands.   Percent differential counts (%) should be interpreted in the   context of the absolute cell counts (cells/L).    Lymphocytes % 04/12/2023 20.9  13.0 - 44.0 % Final    Monocytes % 04/12/2023 6.8  2.0 - 10.0 % Final    Eosinophils % 04/12/2023 2.3  0.0 - 6.0 % Final    Basophils % 04/12/2023 0.6  0.0 - 2.0 % Final    Neutrophils Absolute 04/12/2023 4.26  1.60 - 5.50 x10E9/L Final    Lymphocytes Absolute 04/12/2023 1.29  0.80 - 3.00 x10E9/L Final    Monocytes Absolute 04/12/2023 0.42  0.05 - 0.80 x10E9/L Final    Eosinophils Absolute 04/12/2023 0.14  0.00 - 0.40 x10E9/L Final    Basophils Absolute 04/12/2023 0.04  0.00 - 0.10 x10E9/L Final    Cholesterol 04/12/2023 188  0 - 199 mg/dL Final    .      AGE      DESIRABLE   BORDERLINE HIGH   HIGH     0-19 Y     0 - 169       170 - 199     >/= 200    20-24 Y     0 - 189       190 - 224     >/= 225         >24 Y     0 - 199       200 - 239     >/= 240   **All ranges are based on fasting samples.  Specific   therapeutic targets will vary based on patient-specific   cardiac risk.  .   Pediatric guidelines reference:Pediatrics 2011, 128(S5).   Adult guidelines reference: NCEP ATPIII Guidelines,     TANYA 2001, 258:2486-97  .   Venipuncture immediately after or during the    administration of Metamizole may lead to falsely   low results. Testing should be performed immediately   prior to Metamizole dosing.    HDL 04/12/2023 57.2  mg/dL Final    .      AGE      VERY LOW   LOW     NORMAL    HIGH       0-19 Y       < 35   < 40     40-45     ----    20-24 Y       ----   < 40       >45     ----      >24 Y       ----   < 40     40-60      >60  .    Cholesterol/HDL Ratio 04/12/2023 3.3   Final    REF VALUES  DESIRABLE  < 3.4  HIGH RISK  > 5.0    LDL 04/12/2023 98  0 - 99 mg/dL Final    .                           NEAR      BORD      AGE      DESIRABLE  OPTIMAL    HIGH     HIGH     VERY HIGH     0-19 Y     0 - 109     ---    110-129   >/= 130     ----    20-24 Y     0 - 119     ---    120-159   >/= 160     ----      >24 Y     0 -  99   100-129  130-159   160-189     >/=190  .    VLDL 04/12/2023 33  0 - 40 mg/dL Final    Triglycerides 04/12/2023 166 (H)  0 - 149 mg/dL Final    .      AGE      DESIRABLE   BORDERLINE HIGH   HIGH     VERY HIGH   0 D-90 D    19 - 174         ----         ----        ----  91 D- 9 Y     0 -  74        75 -  99     >/= 100      ----    10-19 Y     0 -  89        90 - 129     >/= 130      ----    20-24 Y     0 - 114       115 - 149     >/= 150      ----         >24 Y     0 - 149       150 - 199    200- 499    >/= 500  .   Venipuncture immediately after or during the    administration of Metamizole may lead to falsely   low results. Testing should be performed immediately   prior to Metamizole dosing.         Assessment/Plan   Diagnoses and all orders for this visit:  Agata was seen today for follow-up.  Diagnoses and all orders for this visit:  Benign essential hypertension (Primary)  -      Discontinue: losartan-hydrochlorothiazide (Hyzaar) 100-25 mg tablet; Take 1 tablet by mouth once daily.  -     losartan-hydrochlorothiazide (Hyzaar) 50-12.5 mg tablet; Take 1 tablet by mouth once daily.  -     ECG 12 lead (Clinic Performed)  -     enalapril (Vasotec) 20 mg tablet; Take 1 tablet (20 mg) by mouth 2 times a day.  Anxiety  Other hyperlipidemia  Shortness of breath  -     ECG 12 lead (Clinic Performed)        Hypertension  Anxiety depression  Chronic knee arthritis  S/p hip and knee replacement  History of LBBB  Screen colon cancer  Immunization    Hypertension  Increase Enalapril to 20 mg BID  Continue metoprolol amlodipine and triamterene hydrochlorothiazide  BP elevated  EKG in office with LBBB - unchanged from Prior  Will consider Cardiology follow-up if persistent shortness of breath after increasing enalapril.    Anxiety depression  Mood stable with escitalopram 10 mg daily  Med refill given    S/p hip and knee replacement follow-up Ortho  Continue diclofenac as needed  Advised to get Cologuard  Prevnar 20 given    Mammo normal [id number]  DEXA scan October 2020 2 repeat October 2024  Up-to-date flu and Prevnar 20  Advised to get Shingrix at pharmacy once respiratory symptoms are better   Cologuard 2022.  continue Curcumin 500 twice daily for aches and pains    F/up PRN or 6 months.    Advance Care Planning   Does not have living will or medical power of .  Patient is going to make one.  Currently full code.          F/up 2 weeks for repeat BP and SOBOE reveal

## 2024-04-16 RX ORDER — ENALAPRIL MALEATE 20 MG/1
20 TABLET ORAL 2 TIMES DAILY
Qty: 180 TABLET | Refills: 0 | Status: SHIPPED | OUTPATIENT
Start: 2024-04-16

## 2024-04-18 ENCOUNTER — TELEPHONE (OUTPATIENT)
Dept: PRIMARY CARE | Facility: CLINIC | Age: 79
End: 2024-04-18
Payer: MEDICARE

## 2024-04-18 NOTE — TELEPHONE ENCOUNTER
Pt is confused with medications. She was told to stop taking triamterene and to start taking losartan- hydrochlorothiazide instead. She was given only a 10 day supply and was told she will get more through her mail pharmacy but didn't receive anything.      She doesn't know if she should go back to the triamterene since you increase her enalapril to BID and doesn't have losartan-hydrochlorothiazide. Please advice

## 2024-05-06 ASSESSMENT — ENCOUNTER SYMPTOMS
SHORTNESS OF BREATH: 0
SWEATS: 0
PALPITATIONS: 0
NECK PAIN: 0
HYPERTENSION: 1
ORTHOPNEA: 0
PND: 0
BLURRED VISION: 0
HEADACHES: 0

## 2024-05-07 ENCOUNTER — LAB (OUTPATIENT)
Dept: LAB | Facility: LAB | Age: 79
End: 2024-05-07
Payer: MEDICARE

## 2024-05-07 ENCOUNTER — OFFICE VISIT (OUTPATIENT)
Dept: PRIMARY CARE | Facility: CLINIC | Age: 79
End: 2024-05-07
Payer: MEDICARE

## 2024-05-07 VITALS
WEIGHT: 203.4 LBS | DIASTOLIC BLOOD PRESSURE: 76 MMHG | SYSTOLIC BLOOD PRESSURE: 134 MMHG | BODY MASS INDEX: 39.93 KG/M2 | HEIGHT: 60 IN | HEART RATE: 65 BPM

## 2024-05-07 DIAGNOSIS — E78.49 OTHER HYPERLIPIDEMIA: ICD-10-CM

## 2024-05-07 DIAGNOSIS — Z12.31 ENCOUNTER FOR SCREENING MAMMOGRAM FOR MALIGNANT NEOPLASM OF BREAST: ICD-10-CM

## 2024-05-07 DIAGNOSIS — F41.9 ANXIETY: ICD-10-CM

## 2024-05-07 DIAGNOSIS — M81.0 AGE-RELATED OSTEOPOROSIS WITHOUT CURRENT PATHOLOGICAL FRACTURE: ICD-10-CM

## 2024-05-07 DIAGNOSIS — I10 BENIGN ESSENTIAL HYPERTENSION: ICD-10-CM

## 2024-05-07 DIAGNOSIS — Z23 ENCOUNTER FOR IMMUNIZATION: Primary | ICD-10-CM

## 2024-05-07 LAB
ALBUMIN SERPL BCP-MCNC: 4.5 G/DL (ref 3.4–5)
ALP SERPL-CCNC: 78 U/L (ref 33–136)
ALT SERPL W P-5'-P-CCNC: 13 U/L (ref 7–45)
ANION GAP SERPL CALC-SCNC: 14 MMOL/L (ref 10–20)
AST SERPL W P-5'-P-CCNC: 14 U/L (ref 9–39)
BASOPHILS # BLD AUTO: 0.04 X10*3/UL (ref 0–0.1)
BASOPHILS NFR BLD AUTO: 0.8 %
BILIRUB SERPL-MCNC: 0.4 MG/DL (ref 0–1.2)
BUN SERPL-MCNC: 38 MG/DL (ref 6–23)
CALCIUM SERPL-MCNC: 9.8 MG/DL (ref 8.6–10.6)
CHLORIDE SERPL-SCNC: 103 MMOL/L (ref 98–107)
CHOLEST SERPL-MCNC: 200 MG/DL (ref 0–199)
CHOLESTEROL/HDL RATIO: 4
CO2 SERPL-SCNC: 29 MMOL/L (ref 21–32)
CREAT SERPL-MCNC: 1 MG/DL (ref 0.5–1.05)
EGFRCR SERPLBLD CKD-EPI 2021: 59 ML/MIN/1.73M*2
EOSINOPHIL # BLD AUTO: 0.11 X10*3/UL (ref 0–0.4)
EOSINOPHIL NFR BLD AUTO: 2.2 %
ERYTHROCYTE [DISTWIDTH] IN BLOOD BY AUTOMATED COUNT: 13.9 % (ref 11.5–14.5)
GLUCOSE SERPL-MCNC: 111 MG/DL (ref 74–99)
HCT VFR BLD AUTO: 49.2 % (ref 36–46)
HDLC SERPL-MCNC: 49.9 MG/DL
HGB BLD-MCNC: 15.2 G/DL (ref 12–16)
IMM GRANULOCYTES # BLD AUTO: 0.02 X10*3/UL (ref 0–0.5)
IMM GRANULOCYTES NFR BLD AUTO: 0.4 % (ref 0–0.9)
LDLC SERPL CALC-MCNC: 118 MG/DL
LYMPHOCYTES # BLD AUTO: 1.12 X10*3/UL (ref 0.8–3)
LYMPHOCYTES NFR BLD AUTO: 22.1 %
MCH RBC QN AUTO: 27.8 PG (ref 26–34)
MCHC RBC AUTO-ENTMCNC: 30.9 G/DL (ref 32–36)
MCV RBC AUTO: 90 FL (ref 80–100)
MONOCYTES # BLD AUTO: 0.32 X10*3/UL (ref 0.05–0.8)
MONOCYTES NFR BLD AUTO: 6.3 %
NEUTROPHILS # BLD AUTO: 3.46 X10*3/UL (ref 1.6–5.5)
NEUTROPHILS NFR BLD AUTO: 68.2 %
NON HDL CHOLESTEROL: 150 MG/DL (ref 0–149)
NRBC BLD-RTO: 0 /100 WBCS (ref 0–0)
PLATELET # BLD AUTO: 168 X10*3/UL (ref 150–450)
POTASSIUM SERPL-SCNC: 4.7 MMOL/L (ref 3.5–5.3)
PROT SERPL-MCNC: 6.8 G/DL (ref 6.4–8.2)
RBC # BLD AUTO: 5.47 X10*6/UL (ref 4–5.2)
SODIUM SERPL-SCNC: 141 MMOL/L (ref 136–145)
TRIGL SERPL-MCNC: 161 MG/DL (ref 0–149)
TSH SERPL-ACNC: 2.85 MIU/L (ref 0.44–3.98)
VLDL: 32 MG/DL (ref 0–40)
WBC # BLD AUTO: 5.1 X10*3/UL (ref 4.4–11.3)

## 2024-05-07 PROCEDURE — 99214 OFFICE O/P EST MOD 30 MIN: CPT | Performed by: FAMILY MEDICINE

## 2024-05-07 PROCEDURE — 36415 COLL VENOUS BLD VENIPUNCTURE: CPT

## 2024-05-07 PROCEDURE — 84443 ASSAY THYROID STIM HORMONE: CPT

## 2024-05-07 PROCEDURE — 3075F SYST BP GE 130 - 139MM HG: CPT | Performed by: FAMILY MEDICINE

## 2024-05-07 PROCEDURE — 3078F DIAST BP <80 MM HG: CPT | Performed by: FAMILY MEDICINE

## 2024-05-07 PROCEDURE — 1159F MED LIST DOCD IN RCRD: CPT | Performed by: FAMILY MEDICINE

## 2024-05-07 PROCEDURE — 80053 COMPREHEN METABOLIC PANEL: CPT

## 2024-05-07 PROCEDURE — 1160F RVW MEDS BY RX/DR IN RCRD: CPT | Performed by: FAMILY MEDICINE

## 2024-05-07 PROCEDURE — 85025 COMPLETE CBC W/AUTO DIFF WBC: CPT

## 2024-05-07 PROCEDURE — 1036F TOBACCO NON-USER: CPT | Performed by: FAMILY MEDICINE

## 2024-05-07 PROCEDURE — 80061 LIPID PANEL: CPT

## 2024-05-07 RX ORDER — ESCITALOPRAM OXALATE 10 MG/1
10 TABLET ORAL DAILY
Qty: 90 TABLET | Refills: 1 | Status: SHIPPED | OUTPATIENT
Start: 2024-05-07

## 2024-05-07 RX ORDER — METOPROLOL SUCCINATE 50 MG/1
50 TABLET, EXTENDED RELEASE ORAL DAILY
Qty: 90 TABLET | Refills: 1 | Status: SHIPPED | OUTPATIENT
Start: 2024-05-07

## 2024-05-07 RX ORDER — TRIAMTERENE AND HYDROCHLOROTHIAZIDE 37.5; 25 MG/1; MG/1
1 CAPSULE ORAL EVERY MORNING
Qty: 90 CAPSULE | Refills: 1 | Status: SHIPPED | OUTPATIENT
Start: 2024-05-07

## 2024-05-07 RX ORDER — TRIAMTERENE AND HYDROCHLOROTHIAZIDE 37.5; 25 MG/1; MG/1
1 CAPSULE ORAL EVERY MORNING
COMMUNITY
End: 2024-05-07 | Stop reason: SDUPTHER

## 2024-05-07 ASSESSMENT — ENCOUNTER SYMPTOMS
HEADACHES: 0
ORTHOPNEA: 0
SHORTNESS OF BREATH: 0
SWEATS: 0
PND: 0
HYPERTENSION: 1
PALPITATIONS: 0
NECK PAIN: 0
BLURRED VISION: 0

## 2024-05-07 ASSESSMENT — PATIENT HEALTH QUESTIONNAIRE - PHQ9
2. FEELING DOWN, DEPRESSED OR HOPELESS: NOT AT ALL
1. LITTLE INTEREST OR PLEASURE IN DOING THINGS: NOT AT ALL
SUM OF ALL RESPONSES TO PHQ9 QUESTIONS 1 AND 2: 0

## 2024-05-07 NOTE — PROGRESS NOTES
Subjective   Patient ID: Agata Alatorre [id number] is a 78 y.o. female who presents for Follow-up (BP check).    Hypertension  This is a recurrent problem. The current episode started more than 1 year ago. The problem has been rapidly improving since onset. The problem is controlled. Pertinent negatives include no anxiety, blurred vision, chest pain, headaches, malaise/fatigue, neck pain, orthopnea, palpitations, peripheral edema, PND, shortness of breath or sweats. Risk factors for coronary artery disease include family history, obesity, post-menopausal state and sedentary lifestyle. There are no compliance problems.         Pt is Following keto Diet and Juan desert and keto chips.  Was sick few weeks ago.  With flu.  Patient took symptomatic treatment.  Most symptoms are better.  Noticing some shortness of breath on exertion.  BP elevated.  Denies headache or dizziness no chest pain no cough.    HTN - Norvasc 10 mg , metoprolol 50 mg , Triamterine - HCTZ, Enalapril was increased to 20 mg twice daily.  Patient taking it as prescribed.  Feeling much better.  BP normal.  Denies any shortness of breath now.   also taking KCL 20 mg daily.     Anxiety/Depression - on escitalopram 10 mg daily for last 10 years. Need refill.   Following Keto Diet. Medication working really well denies any side effect.     history of LBBB used to see cardiology Dr. Cabrera. No recent chest pain or no headache or dizziness.SOB is much better    Osteoarthritis -patient has history of hip and knee replacement by Dr. Peng currently taking diclofenac and turmeric for pain control    like to take her Dog out , read, playing games, [employer].       Social History     Tobacco Use    Smoking status: Former     Current packs/day: 0.00     Average packs/day: 1 pack/day for 35.0 years (35.0 ttl pk-yrs)     Types: Cigarettes     Quit date: 1996     Years since quittin.1    Smokeless tobacco: Never   Vaping Use    Vaping status: Never Used    Substance Use Topics    Alcohol use: Never    Drug use: Never       Allergies   Allergen Reactions    Adhesive Tape-Silicones Other    Digoxin Rash and Unknown       Current Outpatient Medications   Medication Sig Dispense Refill    amLODIPine (Norvasc) 10 mg tablet Take 1 tablet (10 mg) by mouth once daily. 90 tablet 1    diclofenac (Voltaren) 50 mg EC tablet Take 1 tablet (50 mg) by mouth 3 times a day. 270 tablet 1    enalapril (Vasotec) 20 mg tablet Take 1 tablet (20 mg) by mouth 2 times a day. 180 tablet 0    escitalopram (Lexapro) 10 mg tablet Take 1 tablet (10 mg) by mouth once daily. 90 tablet 1    fexofenadine (Allegra) 180 mg tablet Take 1 tablet (180 mg) by mouth once daily.      metoprolol succinate XL (Toprol-XL) 50 mg 24 hr tablet Take 1 tablet (50 mg) by mouth once daily. 90 tablet 1    potassium chloride CR 20 mEq ER tablet Take 1 tablet daily but 2 tablets every 2nd day as directed 180 tablet 1    triamterene-hydrochlorothiazid (Dyazide) 37.5-25 mg capsule Take 1 capsule by mouth once daily in the morning.      losartan-hydrochlorothiazide (Hyzaar) 50-12.5 mg tablet Take 1 tablet by mouth once daily. 10 tablet 0     No current facility-administered medications for this visit.       Physical Exam  /76   Pulse 65   Ht 1.524 m (5')   Wt 92.3 kg (203 lb 6.4 oz)   BMI 39.72 kg/m²     General Appearance:  Alert, cooperative, no distress,   Head:  Normocephalic, atraumatic   Eyes:  PERRL, conjunctiva/corneas clear, EOM's intact,    Lungs:   Clear to auscultation bilaterally, respirations unlabored   Heart:  Regular rate and rhythm, S1 and S2 normal, no murmur,    Neurologic: Normal      No visits with results within 3 Month(s) from this visit.   Latest known visit with results is:   Lab on 04/12/2023   Component Date Value Ref Range Status    Glucose 04/12/2023 107 (H)  74 - 99 mg/dL Final    Sodium 04/12/2023 142  136 - 145 mmol/L Final    Potassium 04/12/2023 4.6  3.5 - 5.3 mmol/L Final     Chloride 04/12/2023 106  98 - 107 mmol/L Final    Bicarbonate 04/12/2023 28  21 - 32 mmol/L Final    Anion Gap 04/12/2023 13  10 - 20 mmol/L Final    Urea Nitrogen 04/12/2023 34 (H)  6 - 23 mg/dL Final    Creatinine 04/12/2023 0.79  0.50 - 1.05 mg/dL Final    GFR Female 04/12/2023 78  >90 mL/min/1.73m2 Final     CALCULATIONS OF ESTIMATED GFR ARE PERFORMED   USING THE 2021 CKD-EPI STUDY REFIT EQUATION   WITHOUT THE RACE VARIABLE FOR THE IDMS-TRACEABLE   CREATININE METHODS.    https://jasn.asnjournals.org/content/early/2021/09/22/ASN.5884126813    Calcium 04/12/2023 9.6  8.6 - 10.6 mg/dL Final    Albumin 04/12/2023 4.2  3.4 - 5.0 g/dL Final    Alkaline Phosphatase 04/12/2023 81  33 - 136 U/L Final    Total Protein 04/12/2023 6.7  6.4 - 8.2 g/dL Final    AST 04/12/2023 14  9 - 39 U/L Final    Total Bilirubin 04/12/2023 0.4  0.0 - 1.2 mg/dL Final    ALT (SGPT) 04/12/2023 14  7 - 45 U/L Final     Patients treated with Sulfasalazine may generate    falsely decreased results for ALT.    WBC 04/12/2023 6.2  4.4 - 11.3 x10E9/L Final    nRBC 04/12/2023 0.0  0.0 - 0.0 /100 WBC Final    RBC 04/12/2023 5.29 (H)  4.00 - 5.20 x10E12/L Final    Hemoglobin 04/12/2023 14.9  12.0 - 16.0 g/dL Final    Hematocrit 04/12/2023 48.5 (H)  36.0 - 46.0 % Final    MCV 04/12/2023 92  80 - 100 fL Final    MCHC 04/12/2023 30.7 (L)  32.0 - 36.0 g/dL Final    Platelets 04/12/2023 151  150 - 450 x10E9/L Final    RDW 04/12/2023 13.9  11.5 - 14.5 % Final    Neutrophils % 04/12/2023 69.2  40.0 - 80.0 % Final    Immature Granulocytes %, Automated 04/12/2023 0.2  0.0 - 0.9 % Final     Immature Granulocyte Count (IG) includes promyelocytes,    myelocytes and metamyelocytes but does not include bands.   Percent differential counts (%) should be interpreted in the   context of the absolute cell counts (cells/L).    Lymphocytes % 04/12/2023 20.9  13.0 - 44.0 % Final    Monocytes % 04/12/2023 6.8  2.0 - 10.0 % Final    Eosinophils % 04/12/2023 2.3  0.0 - 6.0 %  Final    Basophils % 04/12/2023 0.6  0.0 - 2.0 % Final    Neutrophils Absolute 04/12/2023 4.26  1.60 - 5.50 x10E9/L Final    Lymphocytes Absolute 04/12/2023 1.29  0.80 - 3.00 x10E9/L Final    Monocytes Absolute 04/12/2023 0.42  0.05 - 0.80 x10E9/L Final    Eosinophils Absolute 04/12/2023 0.14  0.00 - 0.40 x10E9/L Final    Basophils Absolute 04/12/2023 0.04  0.00 - 0.10 x10E9/L Final    Cholesterol 04/12/2023 188  0 - 199 mg/dL Final    .      AGE      DESIRABLE   BORDERLINE HIGH   HIGH     0-19 Y     0 - 169       170 - 199     >/= 200    20-24 Y     0 - 189       190 - 224     >/= 225         >24 Y     0 - 199       200 - 239     >/= 240   **All ranges are based on fasting samples. Specific   therapeutic targets will vary based on patient-specific   cardiac risk.  .   Pediatric guidelines reference:Pediatrics 2011, 128(S5).   Adult guidelines reference: NCEP ATPIII Guidelines,     TANYA 2001, 258:2486-97  .   Venipuncture immediately after or during the    administration of Metamizole may lead to falsely   low results. Testing should be performed immediately   prior to Metamizole dosing.    HDL 04/12/2023 57.2  mg/dL Final    .      AGE      VERY LOW   LOW     NORMAL    HIGH       0-19 Y       < 35   < 40     40-45     ----    20-24 Y       ----   < 40       >45     ----      >24 Y       ----   < 40     40-60      >60  .    Cholesterol/HDL Ratio 04/12/2023 3.3   Final    REF VALUES  DESIRABLE  < 3.4  HIGH RISK  > 5.0    LDL 04/12/2023 98  0 - 99 mg/dL Final    .                           NEAR      BORD      AGE      DESIRABLE  OPTIMAL    HIGH     HIGH     VERY HIGH     0-19 Y     0 - 109     ---    110-129   >/= 130     ----    20-24 Y     0 - 119     ---    120-159   >/= 160     ----      >24 Y     0 -  99   100-129  130-159   160-189     >/=190  .    VLDL 04/12/2023 33  0 - 40 mg/dL Final    Triglycerides 04/12/2023 166 (H)  0 - 149 mg/dL Final    .      AGE      DESIRABLE   BORDERLINE HIGH   HIGH     VERY HIGH    0 D-90 D    19 - 174         ----         ----        ----  91 D- 9 Y     0 -  74        75 -  99     >/= 100      ----    10-19 Y     0 -  89        90 - 129     >/= 130      ----    20-24 Y     0 - 114       115 - 149     >/= 150      ----         >24 Y     0 - 149       150 - 199    200- 499    >/= 500  .   Venipuncture immediately after or during the    administration of Metamizole may lead to falsely   low results. Testing should be performed immediately   prior to Metamizole dosing.         Assessment/Plan   Diagnoses and all orders for this visit:  Agata was seen today for follow-up.  Diagnoses and all orders for this visit:  Encounter for immunization (Primary)  Benign essential hypertension  -     metoprolol succinate XL (Toprol-XL) 50 mg 24 hr tablet; Take 1 tablet (50 mg) by mouth once daily.  -     triamterene-hydrochlorothiazid (Dyazide) 37.5-25 mg capsule; Take 1 capsule by mouth once daily in the morning.  -     CBC and Auto Differential; Future  Anxiety  -     escitalopram (Lexapro) 10 mg tablet; Take 1 tablet (10 mg) by mouth once daily.  -     CBC and Auto Differential; Future  -     TSH with reflex to Free T4 if abnormal; Future  Encounter for screening mammogram for malignant neoplasm of breast  -     BI mammo bilateral screening tomosynthesis; Future  Age-related osteoporosis without current pathological fracture  -     XR DEXA bone density; Future  Other hyperlipidemia  -     Lipid Panel; Future  -     Comprehensive Metabolic Panel; Future            Hypertension  Anxiety depression  Chronic knee arthritis  S/p hip and knee replacement  History of LBBB  Screen colon cancer  Immunization    Hypertension  Continue  Enalapril to 20 mg BID  Continue metoprolol amlodipine and triamterene hydrochlorothiazide  BP  normal.   EKG in office with LBBB - unchanged from Prior  SOB is better    Anxiety depression  Mood stable with escitalopram 10 mg daily  Med refill given    S/p hip and knee replacement  follow-up Ortho  Continue diclofenac as needed  Advised to get Cologuard  Prevnar 20 given    Mammo normal 2022-need repeat  DEXA scan October 2022 - advised to repeat DEXA  Up-to-date flu and Prevnar 20  Advised to get Shingrix at pharmacy   Cologuard 2022. Repeat 2025  continue Curcumin 500 twice daily for aches and pains        Advance Care Planning   Does not have living will or medical power of .  Patient is going to make one.  Currently full code.    F/up 3 to 4 months for Medicare wellness

## 2024-07-06 DIAGNOSIS — I10 BENIGN ESSENTIAL HYPERTENSION: ICD-10-CM

## 2024-07-08 RX ORDER — ENALAPRIL MALEATE 20 MG/1
20 TABLET ORAL 2 TIMES DAILY
Qty: 180 TABLET | Refills: 1 | Status: SHIPPED | OUTPATIENT
Start: 2024-07-08

## 2024-09-10 ENCOUNTER — APPOINTMENT (OUTPATIENT)
Dept: PRIMARY CARE | Facility: CLINIC | Age: 79
End: 2024-09-10
Payer: MEDICARE

## 2024-09-10 ENCOUNTER — LAB (OUTPATIENT)
Dept: LAB | Facility: LAB | Age: 79
End: 2024-09-10
Payer: MEDICARE

## 2024-09-10 VITALS
DIASTOLIC BLOOD PRESSURE: 83 MMHG | HEART RATE: 77 BPM | HEIGHT: 60 IN | OXYGEN SATURATION: 96 % | WEIGHT: 207.6 LBS | SYSTOLIC BLOOD PRESSURE: 137 MMHG | BODY MASS INDEX: 40.76 KG/M2

## 2024-09-10 DIAGNOSIS — F32.1 CURRENT MODERATE EPISODE OF MAJOR DEPRESSIVE DISORDER WITHOUT PRIOR EPISODE (MULTI): ICD-10-CM

## 2024-09-10 DIAGNOSIS — Z23 NEED FOR IMMUNIZATION AGAINST INFLUENZA: ICD-10-CM

## 2024-09-10 DIAGNOSIS — I42.9 CARDIOMYOPATHY, UNSPECIFIED TYPE (MULTI): ICD-10-CM

## 2024-09-10 DIAGNOSIS — E78.49 OTHER HYPERLIPIDEMIA: ICD-10-CM

## 2024-09-10 DIAGNOSIS — N18.31 STAGE 3A CHRONIC KIDNEY DISEASE (MULTI): ICD-10-CM

## 2024-09-10 DIAGNOSIS — I10 BENIGN ESSENTIAL HYPERTENSION: ICD-10-CM

## 2024-09-10 DIAGNOSIS — E66.01 MORBID OBESITY (MULTI): ICD-10-CM

## 2024-09-10 DIAGNOSIS — R73.02 GLUCOSE INTOLERANCE (IMPAIRED GLUCOSE TOLERANCE): ICD-10-CM

## 2024-09-10 DIAGNOSIS — Z00.00 ENCOUNTER FOR ANNUAL WELLNESS EXAM IN MEDICARE PATIENT: Primary | ICD-10-CM

## 2024-09-10 LAB
BASOPHILS # BLD AUTO: 0.04 X10*3/UL (ref 0–0.1)
BASOPHILS NFR BLD AUTO: 0.6 %
EOSINOPHIL # BLD AUTO: 0.12 X10*3/UL (ref 0–0.4)
EOSINOPHIL NFR BLD AUTO: 1.9 %
ERYTHROCYTE [DISTWIDTH] IN BLOOD BY AUTOMATED COUNT: 13.9 % (ref 11.5–14.5)
HCT VFR BLD AUTO: 45.4 % (ref 36–46)
HGB BLD-MCNC: 14.1 G/DL (ref 12–16)
IMM GRANULOCYTES # BLD AUTO: 0.02 X10*3/UL (ref 0–0.5)
IMM GRANULOCYTES NFR BLD AUTO: 0.3 % (ref 0–0.9)
LYMPHOCYTES # BLD AUTO: 1.11 X10*3/UL (ref 0.8–3)
LYMPHOCYTES NFR BLD AUTO: 17.9 %
MCH RBC QN AUTO: 28.1 PG (ref 26–34)
MCHC RBC AUTO-ENTMCNC: 31.1 G/DL (ref 32–36)
MCV RBC AUTO: 90 FL (ref 80–100)
MONOCYTES # BLD AUTO: 0.39 X10*3/UL (ref 0.05–0.8)
MONOCYTES NFR BLD AUTO: 6.3 %
NEUTROPHILS # BLD AUTO: 4.52 X10*3/UL (ref 1.6–5.5)
NEUTROPHILS NFR BLD AUTO: 73 %
NRBC BLD-RTO: 0 /100 WBCS (ref 0–0)
PLATELET # BLD AUTO: 160 X10*3/UL (ref 150–450)
RBC # BLD AUTO: 5.02 X10*6/UL (ref 4–5.2)
WBC # BLD AUTO: 6.2 X10*3/UL (ref 4.4–11.3)

## 2024-09-10 PROCEDURE — 1158F ADVNC CARE PLAN TLK DOCD: CPT | Performed by: FAMILY MEDICINE

## 2024-09-10 PROCEDURE — 1123F ACP DISCUSS/DSCN MKR DOCD: CPT | Performed by: FAMILY MEDICINE

## 2024-09-10 PROCEDURE — 1160F RVW MEDS BY RX/DR IN RCRD: CPT | Performed by: FAMILY MEDICINE

## 2024-09-10 PROCEDURE — 83036 HEMOGLOBIN GLYCOSYLATED A1C: CPT

## 2024-09-10 PROCEDURE — 1170F FXNL STATUS ASSESSED: CPT | Performed by: FAMILY MEDICINE

## 2024-09-10 PROCEDURE — 85025 COMPLETE CBC W/AUTO DIFF WBC: CPT

## 2024-09-10 PROCEDURE — 99497 ADVNCD CARE PLAN 30 MIN: CPT | Performed by: FAMILY MEDICINE

## 2024-09-10 PROCEDURE — 36415 COLL VENOUS BLD VENIPUNCTURE: CPT

## 2024-09-10 PROCEDURE — 90662 IIV NO PRSV INCREASED AG IM: CPT | Performed by: FAMILY MEDICINE

## 2024-09-10 PROCEDURE — 3075F SYST BP GE 130 - 139MM HG: CPT | Performed by: FAMILY MEDICINE

## 2024-09-10 PROCEDURE — 3079F DIAST BP 80-89 MM HG: CPT | Performed by: FAMILY MEDICINE

## 2024-09-10 PROCEDURE — G0008 ADMIN INFLUENZA VIRUS VAC: HCPCS | Performed by: FAMILY MEDICINE

## 2024-09-10 PROCEDURE — 1036F TOBACCO NON-USER: CPT | Performed by: FAMILY MEDICINE

## 2024-09-10 PROCEDURE — 99213 OFFICE O/P EST LOW 20 MIN: CPT | Performed by: FAMILY MEDICINE

## 2024-09-10 PROCEDURE — 1159F MED LIST DOCD IN RCRD: CPT | Performed by: FAMILY MEDICINE

## 2024-09-10 ASSESSMENT — ENCOUNTER SYMPTOMS
PALPITATIONS: 0
ORTHOPNEA: 0
BLURRED VISION: 0
HEADACHES: 0
NECK PAIN: 0
PND: 0
SHORTNESS OF BREATH: 0
SWEATS: 0
HYPERTENSION: 1

## 2024-09-10 ASSESSMENT — ACTIVITIES OF DAILY LIVING (ADL)
MANAGING_FINANCES: INDEPENDENT
TAKING_MEDICATION: INDEPENDENT
BATHING: INDEPENDENT
DOING_HOUSEWORK: INDEPENDENT
DRESSING: INDEPENDENT
GROCERY_SHOPPING: INDEPENDENT

## 2024-09-10 ASSESSMENT — PATIENT HEALTH QUESTIONNAIRE - PHQ9
1. LITTLE INTEREST OR PLEASURE IN DOING THINGS: NOT AT ALL
SUM OF ALL RESPONSES TO PHQ9 QUESTIONS 1 AND 2: 0
2. FEELING DOWN, DEPRESSED OR HOPELESS: NOT AT ALL
SUM OF ALL RESPONSES TO PHQ9 QUESTIONS 1 AND 2: 0
1. LITTLE INTEREST OR PLEASURE IN DOING THINGS: NOT AT ALL
2. FEELING DOWN, DEPRESSED OR HOPELESS: NOT AT ALL

## 2024-09-10 NOTE — PROGRESS NOTES
Subjective   Patient ID: Agata Alatorre [id number] is a 78 y.o. female who presents for Medicare Annual Wellness Visit Subsequent (Medicare wellness).    Hypertension  This is a recurrent problem. The current episode started more than 1 year ago. The problem has been rapidly improving since onset. The problem is controlled. Pertinent negatives include no anxiety, blurred vision, chest pain, headaches, malaise/fatigue, neck pain, orthopnea, palpitations, peripheral edema, PND, shortness of breath or sweats. Risk factors for coronary artery disease include family history, obesity, post-menopausal state and sedentary lifestyle. There are no compliance problems.         Patient is here for Medicare wellness  Patient is independent doing everything by herself.  No fall in last 6 months.  Good memory good hearing.  Patient walks with a cane.    HTN - Norvasc 10 mg , metoprolol 50 mg , Triamterine - HCTZ, Enalapril  20 mg twice daily.  Bp normal.  Patient taking it as prescribed.  Feeling much better.  BP normal.  Denies any shortness of breath now.   also taking KCL 20 mg daily.     Anxiety/Depression - on escitalopram 10 mg daily for last 10 years. Need refill.   Following Keto Diet. Medication working really well denies any side effect.     history of LBBB used to see cardiology Dr. Cabrera. No recent chest pain or no headache or dizziness.SOB is much better.    Osteoarthritis -patient has history of hip and knee replacement by Dr. Peng currently taking diclofenac and turmeric for pain control    like to take her Dog out , read, playing games, Facebook.       Social History     Tobacco Use    Smoking status: Former     Current packs/day: 0.00     Average packs/day: 1 pack/day for 35.0 years (35.0 ttl pk-yrs)     Types: Cigarettes     Quit date: 1996     Years since quittin.4    Smokeless tobacco: Never   Vaping Use    Vaping status: Never Used   Substance Use Topics    Alcohol use: Never    Drug use: Never        Allergies   Allergen Reactions    Adhesive Tape-Silicones Other    Digoxin Rash and Unknown       Current Outpatient Medications   Medication Sig Dispense Refill    amLODIPine (Norvasc) 10 mg tablet Take 1 tablet (10 mg) by mouth once daily. 90 tablet 1    diclofenac (Voltaren) 50 mg EC tablet Take 1 tablet (50 mg) by mouth 3 times a day. 270 tablet 1    enalapril (Vasotec) 20 mg tablet TAKE 1 TABLET TWICE A DAY (Patient taking differently: Take 1 tablet (20 mg) by mouth 2 times a day. Take one tablet in the AM and one tablet in PM) 180 tablet 1    escitalopram (Lexapro) 10 mg tablet Take 1 tablet (10 mg) by mouth once daily. 90 tablet 1    metoprolol succinate XL (Toprol-XL) 50 mg 24 hr tablet Take 1 tablet (50 mg) by mouth once daily. 90 tablet 1    potassium chloride CR 20 mEq ER tablet Take 1 tablet daily but 2 tablets every 2nd day as directed 180 tablet 1    triamterene-hydrochlorothiazid (Dyazide) 37.5-25 mg capsule Take 1 capsule by mouth once daily in the morning. 90 capsule 1     No current facility-administered medications for this visit.       Physical Exam  /83   Pulse 77   Ht 1.524 m (5')   Wt 94.2 kg (207 lb 9.6 oz)   SpO2 96%   BMI 40.54 kg/m²     General Appearance:  Alert, cooperative, no distress,   Head:  Normocephalic, atraumatic   Eyes:  PERRL, conjunctiva/corneas clear, EOM's intact,    Lungs:   Clear to auscultation bilaterally, respirations unlabored   Heart:  Regular rate and rhythm, S1 and S2 normal, no murmur,    Neurologic: Normal      No visits with results within 3 Month(s) from this visit.   Latest known visit with results is:   Lab on 05/07/2024   Component Date Value Ref Range Status    Cholesterol 05/07/2024 200 (H)  0 - 199 mg/dL Final          Age      Desirable   Borderline High   High     0-19 Y     0 - 169       170 - 199     >/= 200    20-24 Y     0 - 189       190 - 224     >/= 225         >24 Y     0 - 199       200 - 239     >/= 240   **All ranges are  based on fasting samples. Specific   therapeutic targets will vary based on patient-specific   cardiac risk.    Pediatric guidelines reference:Pediatrics 2011, 128(S5).Adult guidelines reference: NCEP ATPIII Guidelines,TANYA 2001, 258:2486-97    Venipuncture immediately after or during the administration of Metamizole may lead to falsely low results. Testing should be performed immediately prior to Metamizole dosing.    HDL-Cholesterol 05/07/2024 49.9  mg/dL Final      Age       Very Low   Low     Normal    High    0-19 Y    < 35      < 40     40-45     ----  20-24 Y    ----     < 40      >45      ----        >24 Y      ----     < 40     40-60      >60      Cholesterol/HDL Ratio 05/07/2024 4.0   Final      Ref Values  Desirable  < 3.4  High Risk  > 5.0    LDL Calculated 05/07/2024 118 (H)  <=99 mg/dL Final                                Near   Borderline      AGE      Desirable  Optimal    High     High     Very High     0-19 Y     0 - 109     ---    110-129   >/= 130     ----    20-24 Y     0 - 119     ---    120-159   >/= 160     ----      >24 Y     0 -  99   100-129  130-159   160-189     >/=190      VLDL 05/07/2024 32  0 - 40 mg/dL Final    Triglycerides 05/07/2024 161 (H)  0 - 149 mg/dL Final       Age         Desirable   Borderline High   High     Very High   0 D-90 D    19 - 174         ----         ----        ----  91 D- 9 Y     0 -  74        75 -  99     >/= 100      ----    10-19 Y     0 -  89        90 - 129     >/= 130      ----    20-24 Y     0 - 114       115 - 149     >/= 150      ----         >24 Y     0 - 149       150 - 199    200- 499    >/= 500    Venipuncture immediately after or during the administration of Metamizole may lead to falsely low results. Testing should be performed immediately prior to Metamizole dosing.    Non HDL Cholesterol 05/07/2024 150 (H)  0 - 149 mg/dL Final          Age       Desirable   Borderline High   High     Very High     0-19 Y     0 - 119       120 - 144     >/=  145    >/= 160    20-24 Y     0 - 149       150 - 189     >/= 190      ----         >24 Y    30 mg/dL above LDL Cholesterol goal      Glucose 05/07/2024 111 (H)  74 - 99 mg/dL Final    Sodium 05/07/2024 141  136 - 145 mmol/L Final    Potassium 05/07/2024 4.7  3.5 - 5.3 mmol/L Final    Chloride 05/07/2024 103  98 - 107 mmol/L Final    Bicarbonate 05/07/2024 29  21 - 32 mmol/L Final    Anion Gap 05/07/2024 14  10 - 20 mmol/L Final    Urea Nitrogen 05/07/2024 38 (H)  6 - 23 mg/dL Final    Creatinine 05/07/2024 1.00  0.50 - 1.05 mg/dL Final    eGFR 05/07/2024 59 (L)  >60 mL/min/1.73m*2 Final    Calculations of estimated GFR are performed using the 2021 CKD-EPI Study Refit equation without the race variable for the IDMS-Traceable creatinine methods.  https://jasn.asnjournals.org/content/early/2021/09/22/ASN.6659307203    Calcium 05/07/2024 9.8  8.6 - 10.6 mg/dL Final    Albumin 05/07/2024 4.5  3.4 - 5.0 g/dL Final    Alkaline Phosphatase 05/07/2024 78  33 - 136 U/L Final    Total Protein 05/07/2024 6.8  6.4 - 8.2 g/dL Final    AST 05/07/2024 14  9 - 39 U/L Final    Bilirubin, Total 05/07/2024 0.4  0.0 - 1.2 mg/dL Final    ALT 05/07/2024 13  7 - 45 U/L Final    Patients treated with Sulfasalazine may generate falsely decreased results for ALT.    WBC 05/07/2024 5.1  4.4 - 11.3 x10*3/uL Final    nRBC 05/07/2024 0.0  0.0 - 0.0 /100 WBCs Final    RBC 05/07/2024 5.47 (H)  4.00 - 5.20 x10*6/uL Final    Hemoglobin 05/07/2024 15.2  12.0 - 16.0 g/dL Final    Hematocrit 05/07/2024 49.2 (H)  36.0 - 46.0 % Final    MCV 05/07/2024 90  80 - 100 fL Final    MCH 05/07/2024 27.8  26.0 - 34.0 pg Final    MCHC 05/07/2024 30.9 (L)  32.0 - 36.0 g/dL Final    RDW 05/07/2024 13.9  11.5 - 14.5 % Final    Platelets 05/07/2024 168  150 - 450 x10*3/uL Final    Neutrophils % 05/07/2024 68.2  40.0 - 80.0 % Final    Immature Granulocytes %, Automated 05/07/2024 0.4  0.0 - 0.9 % Final    Immature Granulocyte Count (IG) includes promyelocytes,  myelocytes and metamyelocytes but does not include bands. Percent differential counts (%) should be interpreted in the context of the absolute cell counts (cells/UL).    Lymphocytes % 05/07/2024 22.1  13.0 - 44.0 % Final    Monocytes % 05/07/2024 6.3  2.0 - 10.0 % Final    Eosinophils % 05/07/2024 2.2  0.0 - 6.0 % Final    Basophils % 05/07/2024 0.8  0.0 - 2.0 % Final    Neutrophils Absolute 05/07/2024 3.46  1.60 - 5.50 x10*3/uL Final    Percent differential counts (%) should be interpreted in the context of the absolute cell counts (cells/uL).    Immature Granulocytes Absolute, Au* 05/07/2024 0.02  0.00 - 0.50 x10*3/uL Final    Lymphocytes Absolute 05/07/2024 1.12  0.80 - 3.00 x10*3/uL Final    Monocytes Absolute 05/07/2024 0.32  0.05 - 0.80 x10*3/uL Final    Eosinophils Absolute 05/07/2024 0.11  0.00 - 0.40 x10*3/uL Final    Basophils Absolute 05/07/2024 0.04  0.00 - 0.10 x10*3/uL Final    Thyroid Stimulating Hormone 05/07/2024 2.85  0.44 - 3.98 mIU/L Final         Assessment/Plan     Agata was seen today for medicare annual wellness visit subsequent.  Diagnoses and all orders for this visit:  Encounter for annual wellness exam in Medicare patient (Primary)  Need for immunization against influenza  -     Flu vaccine, trivalent, preservative free, HIGH-DOSE, age 65y+ (Fluzone)  Benign essential hypertension  -     CBC and Auto Differential; Future  Other hyperlipidemia  -     CBC and Auto Differential; Future  Glucose intolerance (impaired glucose tolerance)  -     Hemoglobin A1c; Future  Current moderate episode of major depressive disorder without prior episode (Multi)  Cardiomyopathy, unspecified type (Multi)  Stage 3a chronic kidney disease (Multi)  Morbid obesity (Multi)        Hypertension  Anxiety depression  Chronic knee arthritis  S/p hip and knee replacement  History of LBBB  Screen colon cancer  Immunization    Hypertension  Continue  Enalapril to 20 mg BID  Continue metoprolol amlodipine and triamterene  hydrochlorothiazide  BP  normal.       Anxiety depression  Mood stable with escitalopram 10 mg daily  Med refill given    S/p hip and knee replacement follow-up Ortho  Continue diclofenac as needed  Advised to get Cologuard  Prevnar 20 given    Mammo normal 2022-advise to schedule  DEXA scan October 2022 - advised to repeat DEXA  Up-to-date flu and Prevnar 20  Advised to get Shingrix at pharmacy   Cologuard 2022. Repeat 2025  continue Curcumin 500 twice daily for aches and pains  Get RSV  vaccine      Advance Care Planning   Does not have living will or medical power of .  Patient is going to make one.  Patient  Will Alatorre will be on medical power of .  Currently full code.    Advanced care planning was discussed for 20 mins  DIscussed in details the options of advanced directives with patient in a voluntary nature. We discussed the differences between full code, comfort care arrest and comfort care. Patient was educated in detail regarding end-of-life care including options for hospice and palliative care. I provided details to patient regarding the importance of creating both a Living Will and Power of  documents and encouraged patient to complete these documents.    F/up 6 months

## 2024-09-11 LAB
EST. AVERAGE GLUCOSE BLD GHB EST-MCNC: 126 MG/DL
HBA1C MFR BLD: 6 %

## 2024-09-16 DIAGNOSIS — E87.6 HYPOKALEMIA: ICD-10-CM

## 2024-09-16 DIAGNOSIS — I10 BENIGN ESSENTIAL HYPERTENSION: ICD-10-CM

## 2024-09-16 RX ORDER — POTASSIUM CHLORIDE 20 MEQ/1
TABLET, EXTENDED RELEASE ORAL
Qty: 135 TABLET | Refills: 1 | Status: SHIPPED | OUTPATIENT
Start: 2024-09-16

## 2024-09-16 RX ORDER — AMLODIPINE BESYLATE 10 MG/1
10 TABLET ORAL DAILY
Qty: 90 TABLET | Refills: 1 | Status: SHIPPED | OUTPATIENT
Start: 2024-09-16

## 2024-10-11 DIAGNOSIS — I10 BENIGN ESSENTIAL HYPERTENSION: ICD-10-CM

## 2024-10-11 RX ORDER — TRIAMTERENE AND HYDROCHLOROTHIAZIDE 37.5; 25 MG/1; MG/1
1 CAPSULE ORAL DAILY
Qty: 90 CAPSULE | Refills: 1 | Status: SHIPPED | OUTPATIENT
Start: 2024-10-11

## 2024-11-04 DIAGNOSIS — I10 BENIGN ESSENTIAL HYPERTENSION: ICD-10-CM

## 2024-11-04 DIAGNOSIS — F41.9 ANXIETY: ICD-10-CM

## 2024-11-04 RX ORDER — METOPROLOL SUCCINATE 50 MG/1
50 TABLET, EXTENDED RELEASE ORAL DAILY
Qty: 90 TABLET | Refills: 1 | Status: SHIPPED | OUTPATIENT
Start: 2024-11-04

## 2024-11-04 RX ORDER — ESCITALOPRAM OXALATE 10 MG/1
10 TABLET ORAL DAILY
Qty: 90 TABLET | Refills: 1 | Status: SHIPPED | OUTPATIENT
Start: 2024-11-04

## 2024-12-20 DIAGNOSIS — I10 BENIGN ESSENTIAL HYPERTENSION: ICD-10-CM

## 2024-12-20 RX ORDER — ENALAPRIL MALEATE 20 MG/1
20 TABLET ORAL 2 TIMES DAILY
Qty: 180 TABLET | Refills: 1 | Status: SHIPPED | OUTPATIENT
Start: 2024-12-20

## 2025-03-08 ASSESSMENT — ENCOUNTER SYMPTOMS
PALPITATIONS: 0
BLURRED VISION: 0
ORTHOPNEA: 0
PND: 0
NECK PAIN: 0
HYPERTENSION: 1
SHORTNESS OF BREATH: 0
HEADACHES: 0
SWEATS: 0

## 2025-03-10 ENCOUNTER — APPOINTMENT (OUTPATIENT)
Dept: PRIMARY CARE | Facility: CLINIC | Age: 80
End: 2025-03-10
Payer: MEDICARE

## 2025-03-10 VITALS
DIASTOLIC BLOOD PRESSURE: 86 MMHG | HEIGHT: 60 IN | BODY MASS INDEX: 40.25 KG/M2 | SYSTOLIC BLOOD PRESSURE: 126 MMHG | WEIGHT: 205 LBS | OXYGEN SATURATION: 97 % | HEART RATE: 73 BPM

## 2025-03-10 DIAGNOSIS — E78.49 OTHER HYPERLIPIDEMIA: ICD-10-CM

## 2025-03-10 DIAGNOSIS — I10 BENIGN ESSENTIAL HYPERTENSION: Primary | ICD-10-CM

## 2025-03-10 DIAGNOSIS — M81.0 AGE-RELATED OSTEOPOROSIS WITHOUT CURRENT PATHOLOGICAL FRACTURE: ICD-10-CM

## 2025-03-10 DIAGNOSIS — R73.02 GLUCOSE INTOLERANCE (IMPAIRED GLUCOSE TOLERANCE): ICD-10-CM

## 2025-03-10 DIAGNOSIS — F32.1 CURRENT MODERATE EPISODE OF MAJOR DEPRESSIVE DISORDER WITHOUT PRIOR EPISODE (MULTI): ICD-10-CM

## 2025-03-10 PROCEDURE — 3079F DIAST BP 80-89 MM HG: CPT | Performed by: FAMILY MEDICINE

## 2025-03-10 PROCEDURE — 1123F ACP DISCUSS/DSCN MKR DOCD: CPT | Performed by: FAMILY MEDICINE

## 2025-03-10 PROCEDURE — 3074F SYST BP LT 130 MM HG: CPT | Performed by: FAMILY MEDICINE

## 2025-03-10 PROCEDURE — 1036F TOBACCO NON-USER: CPT | Performed by: FAMILY MEDICINE

## 2025-03-10 PROCEDURE — 1159F MED LIST DOCD IN RCRD: CPT | Performed by: FAMILY MEDICINE

## 2025-03-10 PROCEDURE — 99214 OFFICE O/P EST MOD 30 MIN: CPT | Performed by: FAMILY MEDICINE

## 2025-03-10 RX ORDER — ALENDRONATE SODIUM 70 MG/1
70 TABLET ORAL
Qty: 4 TABLET | Refills: 3 | Status: SHIPPED | OUTPATIENT
Start: 2025-03-10 | End: 2026-03-10

## 2025-03-10 RX ORDER — PSYLLIUM HUSK 0.4 G
1 CAPSULE ORAL DAILY
COMMUNITY

## 2025-03-10 NOTE — PROGRESS NOTES
Subjective   Patient ID: Agata Alatorre [id number] is a 79 y.o. female who presents for Follow-up.        Patient is here for follow up.   Patient with hypertension anxiety depression osteoarthritis here for 6-month follow-up  Recently had mammogram which was normal.   bone density scan concerning for osteoporosis-need for Fosamax discussed with the patient.  Patient was advised to start Fosamax once a week first thing in the morning with empty stomach advised to stay upright for 30 minutes.    HTN - Norvasc 10 mg , metoprolol 50 mg , Triamterine - HCTZ, Enalapril  20 mg twice daily.  Bp normal.  Patient taking it as prescribed.  Feeling much better.  BP normal.  Denies any shortness of breath now.   also taking KCL 20 mg daily.     Anxiety/Depression - on escitalopram 10 mg daily for last 10 years. Need refill.   Following Keto Diet. Medication working really well denies any side effect.     history of LBBB used to see cardiology Dr. Cabrera. No recent chest pain or no headache or dizziness.SOB is much better.    Osteoarthritis -patient has history of hip and knee replacement by Dr. Peng currently taking diclofenac and turmeric for pain control    like to take her Dog out , read, playing games, [employer].       Social History     Tobacco Use    Smoking status: Former     Current packs/day: 0.00     Average packs/day: 1 pack/day for 35.0 years (35.0 ttl pk-yrs)     Types: Cigarettes     Quit date: 1996     Years since quittin.9    Smokeless tobacco: Never   Vaping Use    Vaping status: Never Used   Substance Use Topics    Alcohol use: Never    Drug use: Never       Allergies   Allergen Reactions    Adhesive Tape-Silicones Other    Digoxin Rash and Unknown       Current Outpatient Medications   Medication Sig Dispense Refill    amLODIPine (Norvasc) 10 mg tablet TAKE 1 TABLET DAILY 90 tablet 1    diclofenac (Voltaren) 50 mg EC tablet Take 1 tablet (50 mg) by mouth 3 times a day. 270 tablet 1    enalapril  (Vasotec) 20 mg tablet Take 1 tablet (20 mg) by mouth 2 times a day. Take one tablet in the AM and one tablet in  tablet 1    escitalopram (Lexapro) 10 mg tablet TAKE 1 TABLET DAILY 90 tablet 1    metoprolol succinate XL (Toprol-XL) 50 mg 24 hr tablet TAKE 1 TABLET DAILY 90 tablet 1    potassium chloride CR (Klor-Con M20) 20 mEq ER tablet TAKE 1 TABLET DAILY BUT 2 TABLETS EVERY SECOND DAY AS DIRECTED 135 tablet 1    triamterene-hydrochlorothiazid (Dyazide) 37.5-25 mg capsule TAKE 1 CAPSULE DAILY IN THE MORNING 90 capsule 1     No current facility-administered medications for this visit.       Physical Exam  /86   Pulse 73   Ht 1.524 m (5')   Wt 93 kg (205 lb)   SpO2 97%   BMI 40.04 kg/m²     General Appearance:  Alert, cooperative, no distress,   Head:  Normocephalic, atraumatic   Eyes:  PERRL, conjunctiva/corneas clear, EOM's intact,    Lungs:   Clear to auscultation bilaterally, respirations unlabored   Heart:  Regular rate and rhythm, S1 and S2 normal, no murmur,    Neurologic: Normal      No visits with results within 3 Month(s) from this visit.   Latest known visit with results is:   Lab on 09/10/2024   Component Date Value Ref Range Status    WBC 09/10/2024 6.2  4.4 - 11.3 x10*3/uL Final    nRBC 09/10/2024 0.0  0.0 - 0.0 /100 WBCs Final    RBC 09/10/2024 5.02  4.00 - 5.20 x10*6/uL Final    Hemoglobin 09/10/2024 14.1  12.0 - 16.0 g/dL Final    Hematocrit 09/10/2024 45.4  36.0 - 46.0 % Final    MCV 09/10/2024 90  80 - 100 fL Final    MCH 09/10/2024 28.1  26.0 - 34.0 pg Final    MCHC 09/10/2024 31.1 (L)  32.0 - 36.0 g/dL Final    RDW 09/10/2024 13.9  11.5 - 14.5 % Final    Platelets 09/10/2024 160  150 - 450 x10*3/uL Final    Neutrophils % 09/10/2024 73.0  40.0 - 80.0 % Final    Immature Granulocytes %, Automated 09/10/2024 0.3  0.0 - 0.9 % Final    Immature Granulocyte Count (IG) includes promyelocytes, myelocytes and metamyelocytes but does not include bands. Percent differential counts (%)  should be interpreted in the context of the absolute cell counts (cells/UL).    Lymphocytes % 09/10/2024 17.9  13.0 - 44.0 % Final    Monocytes % 09/10/2024 6.3  2.0 - 10.0 % Final    Eosinophils % 09/10/2024 1.9  0.0 - 6.0 % Final    Basophils % 09/10/2024 0.6  0.0 - 2.0 % Final    Neutrophils Absolute 09/10/2024 4.52  1.60 - 5.50 x10*3/uL Final    Percent differential counts (%) should be interpreted in the context of the absolute cell counts (cells/uL).    Immature Granulocytes Absolute, Au* 09/10/2024 0.02  0.00 - 0.50 x10*3/uL Final    Lymphocytes Absolute 09/10/2024 1.11  0.80 - 3.00 x10*3/uL Final    Monocytes Absolute 09/10/2024 0.39  0.05 - 0.80 x10*3/uL Final    Eosinophils Absolute 09/10/2024 0.12  0.00 - 0.40 x10*3/uL Final    Basophils Absolute 09/10/2024 0.04  0.00 - 0.10 x10*3/uL Final    Hemoglobin A1C 09/10/2024 6.0 (H)  see below % Final    Estimated Average Glucose 09/10/2024 126  Not Established mg/dL Final         Assessment/Plan     Agata was seen today for follow-up.  Diagnoses and all orders for this visit:  Benign essential hypertension (Primary)  -     Comprehensive Metabolic Panel; Future  -     Comprehensive Metabolic Panel  Other hyperlipidemia  -     Lipid Panel; Future  -     Lipid Panel  Glucose intolerance (impaired glucose tolerance)  -     Hemoglobin A1C; Future  -     Hemoglobin A1C  Current moderate episode of major depressive disorder without prior episode (Multi)  Age-related osteoporosis without current pathological fracture  -     alendronate (Fosamax) 70 mg tablet; Take 1 tablet (70 mg) by mouth every 7 days. Take in the morning with a full glass of water, on an empty stomach, and do not take anything else by mouth or lie down for the next 30 min.      Osteoporosis  Advised to start Fosamax once a week  Side effect informed      Hypertension  Anxiety depression  Chronic knee arthritis  S/p hip and knee replacement  History of LBBB  Screen colon  cancer  Immunization    Hypertension  Continue  Enalapril to 20 mg BID  Continue metoprolol amlodipine and triamterene hydrochlorothiazide  BP  normal.       Anxiety depression  Mood stable with escitalopram 10 mg daily  Med refill given    S/p hip and knee replacement follow-up Ortho  Continue diclofenac as needed  Advised to get Cologuard  Prevnar 20 UTD    Mammo normal 2024- normal -no more mammogram needed.  DEXA scan 2025 - with osteoporosis -repeat DEXA January 2027  Up-to-date flu and Prevnar 20  Advised to get Shingrix at pharmacy   Cologuard 2022. Repeat 2025  continue Curcumin 500 twice daily for aches and pains  Continue calcium vitamin D BID      Advance Care Planning   Does not have living will or medical power of .  Patient is going to make one.  Patient  Will Alatorre will be on medical power of .  Currently full code.    F/up 3-6 months for medicare wellness

## 2025-03-11 LAB
ALBUMIN SERPL-MCNC: 4.6 G/DL (ref 3.6–5.1)
ALP SERPL-CCNC: 83 U/L (ref 37–153)
ALT SERPL-CCNC: 14 U/L (ref 6–29)
ANION GAP SERPL CALCULATED.4IONS-SCNC: 7 MMOL/L (CALC) (ref 7–17)
AST SERPL-CCNC: 13 U/L (ref 10–35)
BILIRUB SERPL-MCNC: 0.4 MG/DL (ref 0.2–1.2)
BUN SERPL-MCNC: 41 MG/DL (ref 7–25)
CALCIUM SERPL-MCNC: 9.4 MG/DL (ref 8.6–10.4)
CHLORIDE SERPL-SCNC: 106 MMOL/L (ref 98–110)
CHOLEST SERPL-MCNC: 208 MG/DL
CHOLEST/HDLC SERPL: 4 (CALC)
CO2 SERPL-SCNC: 26 MMOL/L (ref 20–32)
CREAT SERPL-MCNC: 1.17 MG/DL (ref 0.6–1)
EGFRCR SERPLBLD CKD-EPI 2021: 48 ML/MIN/1.73M2
EST. AVERAGE GLUCOSE BLD GHB EST-MCNC: 131 MG/DL
EST. AVERAGE GLUCOSE BLD GHB EST-SCNC: 7.3 MMOL/L
GLUCOSE SERPL-MCNC: 124 MG/DL (ref 65–99)
HBA1C MFR BLD: 6.2 % OF TOTAL HGB
HDLC SERPL-MCNC: 52 MG/DL
LDLC SERPL CALC-MCNC: 131 MG/DL (CALC)
NONHDLC SERPL-MCNC: 156 MG/DL (CALC)
POTASSIUM SERPL-SCNC: 5.3 MMOL/L (ref 3.5–5.3)
PROT SERPL-MCNC: 7 G/DL (ref 6.1–8.1)
SODIUM SERPL-SCNC: 139 MMOL/L (ref 135–146)
TRIGL SERPL-MCNC: 141 MG/DL

## 2025-03-17 DIAGNOSIS — I10 BENIGN ESSENTIAL HYPERTENSION: ICD-10-CM

## 2025-03-17 DIAGNOSIS — E87.6 HYPOKALEMIA: ICD-10-CM

## 2025-03-17 RX ORDER — POTASSIUM CHLORIDE 20 MEQ/1
TABLET, EXTENDED RELEASE ORAL
Qty: 135 TABLET | Refills: 3 | Status: SHIPPED | OUTPATIENT
Start: 2025-03-17

## 2025-03-17 RX ORDER — AMLODIPINE BESYLATE 10 MG/1
10 TABLET ORAL DAILY
Qty: 90 TABLET | Refills: 3 | Status: SHIPPED | OUTPATIENT
Start: 2025-03-17

## 2025-03-26 DIAGNOSIS — M19.90 OSTEOARTHRITIS, UNSPECIFIED OSTEOARTHRITIS TYPE, UNSPECIFIED SITE: ICD-10-CM

## 2025-03-26 RX ORDER — DICLOFENAC SODIUM 50 MG/1
50 TABLET, DELAYED RELEASE ORAL 3 TIMES DAILY
Qty: 270 TABLET | Refills: 1 | Status: SHIPPED | OUTPATIENT
Start: 2025-03-26

## 2025-04-09 DIAGNOSIS — I10 BENIGN ESSENTIAL HYPERTENSION: ICD-10-CM

## 2025-04-09 RX ORDER — TRIAMTERENE AND HYDROCHLOROTHIAZIDE 37.5; 25 MG/1; MG/1
1 CAPSULE ORAL DAILY
Qty: 90 CAPSULE | Refills: 1 | Status: SHIPPED | OUTPATIENT
Start: 2025-04-09

## 2025-05-01 DIAGNOSIS — I10 BENIGN ESSENTIAL HYPERTENSION: ICD-10-CM

## 2025-05-01 DIAGNOSIS — F41.9 ANXIETY: ICD-10-CM

## 2025-05-01 RX ORDER — ESCITALOPRAM OXALATE 10 MG/1
10 TABLET ORAL DAILY
Qty: 90 TABLET | Refills: 3 | Status: SHIPPED | OUTPATIENT
Start: 2025-05-01

## 2025-05-01 RX ORDER — METOPROLOL SUCCINATE 50 MG/1
50 TABLET, EXTENDED RELEASE ORAL DAILY
Qty: 90 TABLET | Refills: 3 | Status: SHIPPED | OUTPATIENT
Start: 2025-05-01

## 2025-06-26 DIAGNOSIS — I10 BENIGN ESSENTIAL HYPERTENSION: ICD-10-CM

## 2025-06-26 RX ORDER — ENALAPRIL MALEATE 20 MG/1
TABLET ORAL
Qty: 180 TABLET | Refills: 1 | Status: SHIPPED | OUTPATIENT
Start: 2025-06-26

## 2025-09-01 DIAGNOSIS — M19.90 OSTEOARTHRITIS, UNSPECIFIED OSTEOARTHRITIS TYPE, UNSPECIFIED SITE: ICD-10-CM

## 2025-09-04 RX ORDER — DICLOFENAC SODIUM 50 MG/1
50 TABLET, DELAYED RELEASE ORAL 3 TIMES DAILY
Qty: 30 TABLET | Refills: 0 | Status: SHIPPED | OUTPATIENT
Start: 2025-09-04

## 2025-09-15 ENCOUNTER — APPOINTMENT (OUTPATIENT)
Dept: PRIMARY CARE | Facility: CLINIC | Age: 80
End: 2025-09-15
Payer: MEDICARE